# Patient Record
Sex: MALE | Race: WHITE | NOT HISPANIC OR LATINO | Employment: OTHER | URBAN - METROPOLITAN AREA
[De-identification: names, ages, dates, MRNs, and addresses within clinical notes are randomized per-mention and may not be internally consistent; named-entity substitution may affect disease eponyms.]

---

## 2017-08-03 ENCOUNTER — PATIENT OUTREACH (OUTPATIENT)
Dept: HEALTH INFORMATION MANAGEMENT | Facility: OTHER | Age: 72
End: 2017-08-03

## 2017-08-03 NOTE — PROGRESS NOTES
Attempt #:1    WebIZ Checked & Epic Updated: yes  HealthConnect Verified: no  Verify PCP: yes    Communication Preference Obtained: yes     Review Care Team: yes    Annual Wellness Visit Scheduling  1. Scheduling Status:Scheduled       Care Gap Scheduling (Attempt to Schedule EACH Overdue Care Gap!)     Health Maintenance Due   Topic Date Due   • IMM DTaP/Tdap/Td Vaccine (1 - Tdap) [facility]   • COLONOSCOPY  Has an apt for Sept.   • IMM ZOSTER VACCINE  [facility]   • IMM PNEUMOCOCCAL 65+ (ADULT) LOW/MEDIUM RISK SERIES (1 of 2 - PCV13) [facility]   • Annual Wellness Visit  Scheduled         Corpsolv Activation: sent activation code  Corps[employer] Geovanni: no  Virtual Visits: no  Opt In to Text Messages: no

## 2017-08-09 ENCOUNTER — TELEPHONE (OUTPATIENT)
Dept: MEDICAL GROUP | Facility: PHYSICIAN GROUP | Age: 72
End: 2017-08-09

## 2017-08-09 NOTE — TELEPHONE ENCOUNTER
ANNUAL WELLNESS VISIT PRE-VISIT PLANNING     1.  Reviewed note from last office visit with PCP: YES    2.  If any orders were placed at last visit, do we have Results/Consult Notes?        •  Labs - Labs were not ordered at last office visit.       •  Imaging - Imaging was not ordered at last office visit.       •  Referrals - No referrals were ordered at last office visit.    3.  Immunizations were updated in Epic using WebIZ?: Epic matches WebIZ       •  WebIZ Recommendations: PREVNAR (PCV13) , TDAP and ZOSTAVAX (Shingles)       •  Is patient due for Tdap? YES. Patient was not notified of copay.       •  Is patient due for Shingles? YES. Patient was not notified of copay.     4.  Patient is due for the following Health Maintenance Topics:   Health Maintenance Due   Topic Date Due   • IMM DTaP/Tdap/Td Vaccine (1 - Tdap) 08[birth year]01/1964   • COLONOSCOPY  08/01/1994   • IMM ZOSTER VACCINE  08/01/2004   • IMM PNEUMOCOCCAL 65+ (ADULT) LOW/MEDIUM RISK SERIES (1 of 2 - PCV13) 08/01/2009   • Annual Wellness Visit  08/27/2016           5.  Reviewed/Updated the following with patient:       •   Preferred Pharmacy? NO       •   Preferred Lab? NO       •   Medications? NO       •   Social History? NO       •   Family History? NO    6.  Care Team Updated:       •   DME Company (gait device, O2, CPAP, etc.): N\A       •   Other Specialists (eye doctor, derm, GYN, cardiology, endo, etc): N\A    7.  Patient has the following Care Path diagnoses on Problem List:  NONE    8.  Specialty Comments was updated with diagnosis information provided by [facility]: N\A    9.  Patient was advised: “This is a free wellness visit. The provider will screen for medical conditions to help you stay healthy. If you have other concerns to address you may be asked to discuss these at a separate visit or there may be an additional fee.”     6.  Patient was informed to arrive 15 min prior to their scheduled appointment and bring in their medication bottles.

## 2017-08-17 ENCOUNTER — OFFICE VISIT (OUTPATIENT)
Dept: MEDICAL GROUP | Facility: PHYSICIAN GROUP | Age: 72
End: 2017-08-17
Payer: MEDICARE

## 2017-08-17 VITALS
BODY MASS INDEX: 24.28 KG/M2 | SYSTOLIC BLOOD PRESSURE: 136 MMHG | HEART RATE: 69 BPM | HEIGHT: 68 IN | OXYGEN SATURATION: 97 % | TEMPERATURE: 98.6 F | DIASTOLIC BLOOD PRESSURE: 78 MMHG | WEIGHT: 160.2 LBS | RESPIRATION RATE: 16 BRPM

## 2017-08-17 DIAGNOSIS — Z86.010 HISTORY OF COLON POLYPS: ICD-10-CM

## 2017-08-17 DIAGNOSIS — N52.9 ERECTILE DYSFUNCTION, UNSPECIFIED ERECTILE DYSFUNCTION TYPE: ICD-10-CM

## 2017-08-17 DIAGNOSIS — L57.0 ACTINIC KERATOSIS: ICD-10-CM

## 2017-08-17 DIAGNOSIS — I10 ESSENTIAL HYPERTENSION: ICD-10-CM

## 2017-08-17 DIAGNOSIS — E83.110 HEREDITARY HEMOCHROMATOSIS (HCC): ICD-10-CM

## 2017-08-17 DIAGNOSIS — Z00.00 MEDICARE ANNUAL WELLNESS VISIT, SUBSEQUENT: ICD-10-CM

## 2017-08-17 DIAGNOSIS — E78.2 MIXED HYPERLIPIDEMIA: ICD-10-CM

## 2017-08-17 PROCEDURE — G0439 PPPS, SUBSEQ VISIT: HCPCS | Performed by: FAMILY MEDICINE

## 2017-08-17 RX ORDER — SILDENAFIL 100 MG/1
50-100 TABLET, FILM COATED ORAL PRN
Qty: 10 TAB | Refills: 3 | Status: SHIPPED | OUTPATIENT
Start: 2017-08-17

## 2017-08-17 ASSESSMENT — PAIN SCALES - GENERAL: PAINLEVEL: 2=MINIMAL-SLIGHT

## 2017-08-17 ASSESSMENT — PATIENT HEALTH QUESTIONNAIRE - PHQ9: CLINICAL INTERPRETATION OF PHQ2 SCORE: 0

## 2017-08-17 NOTE — PROGRESS NOTES
Chief Complaint   Patient presents with   • Annual Wellness Visit         HPI:  Steve is a 72 y.o. here for Medicare Annual Wellness Visit        Patient Active Problem List    Diagnosis Date Noted   • Left upper arm pain 04/19/2016   • Hypokalemia 04/19/2016   • Rosacea 04/19/2016   • Actinic keratosis 09/09/2014   • HTN (hypertension) 08/07/2014   • Hyperlipidemia 08/07/2014   • Hereditary hemochromatosis (CMS-HCC) 08/07/2014   • Dizziness 08/07/2014   • History of colon polyps 08/07/2014       Current Outpatient Prescriptions   Medication Sig Dispense Refill   • Cobalamine Combinations (LIVER-B-12 PO) Take  by mouth.     • POTASSIUM PO Take  by mouth.     • triamterene/hctz (MAXZIDE-25/DYAZIDE) 37.5-25 MG CAPS Take 1 Cap by mouth every morning. 90 Cap 3   • aspirin EC (ECOTRIN) 81 MG TBEC Take 81 mg by mouth every day.     • Cholecalciferol (VITAMIN D PO) Take  by mouth.     • FOLIC ACID PO Take  by mouth.     • simvastatin (ZOCOR) 10 MG TABS Take 10 mg by mouth every evening.       No current facility-administered medications for this visit.        Patient is taking medications as noted in medication list.  Current supplements as per medication list.     Allergies: Lisinopril    Current social contact/activities: out door activities    Is patient current with immunizations? Yes.    He  reports that he has been smoking Cigarettes.  He has a 15 pack-year smoking history. He has never used smokeless tobacco. He reports that he drinks alcohol. He reports that he does not use illicit drugs.  Ready to quit: Yes  Counseling given: Yes        DPA/Advanced directive: Patient has Advanced Directive, but it is not on file. Instructed to bring in a copy to scan into their chart.    ROS:    Gait: Uses no assistive device   Ostomy: no   Other tubes: no   Amputations: no   Chronic oxygen use no   Last eye exam 2015   Wears hearing aids: no   : Denies incontinence.         Depression Screening    Little interest or  pleasure in doing things?  0 - not at all  Feeling down, depressed, or hopeless? 0 - not at all  Patient Health Questionnaire Score: 0    If depressive symptoms identified deferred to follow up visit unless specifically addressed in assessment and plan.    Interpretation of PHQ-9 Total Score   Score Severity   1-4 No Depression   5-9 Mild Depression   10-14 Moderate Depression   15-19 Moderately Severe Depression   20-27 Severe Depression    Screening for Cognitive Impairment    Three Minute Recall (apple, watch, josé miguel)  2/3    Draw clock face with all 12 numbers set to the hand to show 10 minutes past 11 o'clock    5/5  If cognitive concerns identified, deferred for follow up unless specifically addressed in assessment and plan.    Fall Risk Assessment    Has the patient had two or more falls in the last year or any fall with injury in the last year?  Yes  If fall risk identified, deferred for follow up unless specifically addressed in assessment and plan.    Safety Assessment    Throw rugs on floor.  No  Handrails on all stairs.  Yes  Good lighting in all hallways.  Yes  Difficulty hearing.  No  Patient counseled about all safety risks that were identified.    Functional Assessment ADLs    Are there any barriers preventing you from cooking for yourself or meeting nutritional needs?  No.    Are there any barriers preventing you from driving safely or obtaining transportation?  No.    Are there any barriers preventing you from using a telephone or calling for help?  No.    Are there any barriers preventing you from shopping?  No.    Are there any barriers preventing you from taking care of your own finances?  No.    Are there any barriers preventing you from managing your medications?  No.    Are you currently engaging any exercise or physical activity?  Yes.  Stationary bikes 3 to 4 nights a week 20 minutes    Health Maintenance Summary                IMM DTaP/Tdap/Td Vaccine Overdue 8/1/1963     COLONOSCOPY  "Overdue 8/1/1994     IMM ZOSTER VACCINE Overdue 8/1/2004     IMM PNEUMOCOCCAL 65+ (ADULT) LOW/MEDIUM RISK SERIES Overdue 8/1/2009     Annual Wellness Visit Overdue 8/27/2016      Done 8/27/2015 Visit Dx: Encounter for Medicare annual wellness exam    IMM INFLUENZA Next Due 9/1/2017           Patient Care Team:  Yola Asif M.D. as PCP - General (Family Medicine)  Alan Wick M.D. as Consulting Physician (Family Medicine)    Social History   Substance Use Topics   • Smoking status: Current Every Day Smoker -- 0.30 packs/day for 50 years     Types: Cigarettes   • Smokeless tobacco: Never Used   • Alcohol Use: 0.0 oz/week     0 Standard drinks or equivalent per week      Comment: Nearly 1 drink nightly, liqour.     Family History   Problem Relation Age of Onset   • Stroke Father 78     He  has a past medical history of HTN (hypertension) (8/7/2014); Hyperlipidemia (8/7/2014); and Hypertension.   Past Surgical History   Procedure Laterality Date   • Ganglion excision             Exam:     Blood pressure 136/78, pulse 69, temperature 37 °C (98.6 °F), resp. rate 16, height 1.715 m (5' 7.5\"), weight 72.666 kg (160 lb 3.2 oz), SpO2 97 %. Body mass index is 24.71 kg/(m^2).    Hearing excellent.    Dentition good  Alert, oriented in no acute distress.  Eye contact is good, speech goal directed, affect calm      Assessment and Plan. The following treatment and monitoring plan is recommended:    Encounter Diagnoses   Name Primary?   • Actinic keratosis    • Hereditary hemochromatosis (CMS-HCC)    • History of colon polyps    • Essential hypertension    • Mixed hyperlipidemia    • Medicare annual wellness visit, subsequent    • Erectile dysfunction, unspecified erectile dysfunction type      Stable, request [facility] records, follow up in 3 months      Services suggested: No services needed at this time, we do need to work [facility].  Patient will work on getting patient portal system.    Health Care " Screening recommendations as per orders if indicated.  Referrals offered: PT/OT/Nutrition counseling/Behavioral Health/Smoking cessation as per orders if indicated.    Discussion today about general wellness and lifestyle habits:    · Prevent falls and reduce trip hazards; Cautioned about securing or removing rugs.  · Have a working fire alarm and carbon monoxide detector;   · Engage in regular physical activity and social activities       Follow-up: No Follow-up on file.

## 2017-08-17 NOTE — MR AVS SNAPSHOT
"        Steve Darryn   2017 8:40 AM   Office Visit   MRN: [id number]    Department:  [facility]   Dept Phone:  [phone]    Description:  Male : 1945   Provider:  Yola Asif M.D.; [facility]            Reason for Visit     Annual Wellness Visit           Allergies as of 2017     Allergen Noted Reactions    Lisinopril 2014   Swelling      You were diagnosed with     Actinic keratosis   [702.0.ICD-9-CM]       Hereditary hemochromatosis (CMS-HCC)   [275.01.ICD-9-CM]       History of colon polyps   [578937]       Essential hypertension   [5623773]       Mixed hyperlipidemia   [272.2.ICD-9-CM]       Medicare annual wellness visit, subsequent   [128570]       Erectile dysfunction, unspecified erectile dysfunction type   [8726574]         Vital Signs     Blood Pressure Pulse Temperature Respirations Height Weight    136/78 mmHg 69 37 °C (98.6 °F) 16 1.715 m (5' 7.5\") 72.666 kg (160 lb 3.2 oz)    Body Mass Index Oxygen Saturation Smoking Status             24.71 kg/m2 97% Current Every Day Smoker         Basic Information     Date Of Birth Sex Race Ethnicity Preferred Language    1945 Male White Non- English      Problem List              ICD-10-CM Priority Class Noted - Resolved    HTN (hypertension) I10   2014 - Present    Hyperlipidemia E78.5   2014 - Present    Hereditary hemochromatosis (CMS-HCC) E83.110   2014 - Present    History of colon polyps Z86.010   2014 - Present    Actinic keratosis L57.0   2014 - Present      Health Maintenance        Date Due Completion Dates    IMM DTaP/Tdap/Td Vaccine (1 - Tdap) 1963 ---    COLONOSCOPY 1994 ---    IMM ZOSTER VACCINE 2004 ---    IMM PNEUMOCOCCAL 65+ (ADULT) LOW/MEDIUM RISK SERIES (1 of 2 - PCV13) 2009 ---    IMM INFLUENZA (1) 2017 ---            Current Immunizations     No immunizations on file.      Below and/or attached are the medications your provider expects you " to take. Review all of your home medications and newly ordered medications with your provider and/or pharmacist. Follow medication instructions as directed by your provider and/or pharmacist. Please keep your medication list with you and share with your provider. Update the information when medications are discontinued, doses are changed, or new medications (including over-the-counter products) are added; and carry medication information at all times in the event of emergency situations     Allergies:  LISINOPRIL - Swelling               Medications  Valid as of: August 17, 2017 - 10:06 AM    Generic Name Brand Name Tablet Size Instructions for use    Aspirin (Tablet Delayed Response) ECOTRIN 81 MG Take 81 mg by mouth every day.        Cholecalciferol   Take  by mouth.        Cobalamine Combinations   Take  by mouth.        Folic Acid   Take  by mouth.        Potassium   Take  by mouth.        Sildenafil Citrate (Tab) VIAGRA 100 MG Take 0.5-1 Tabs by mouth as needed for Erectile Dysfunction.        Simvastatin (Tab) ZOCOR 10 MG Take 10 mg by mouth every evening.        Triamterene-HCTZ (Cap) MAXZIDE-25/DYAZIDE 37.5-25 MG Take 1 Cap by mouth every morning.        .                 Medicines prescribed today were sent to:     [id number] - [place], [place] - [address].    [address]. [facility] [address]    Phone: [phone] Fax: [phone]    Open 24 Hours?: No    [id number] - [place], [place] - [address] [address]    Phone: [phone] Fax: [phone]    Open 24 Hours?: No      Medication refill instructions:       If your prescription bottle indicates you have medication refills left, it is not necessary to call your provider’s office. Please contact your pharmacy and they will refill your medication.    If your prescription bottle indicates you do not have any refills left, you may request refills at any time through one of the following ways: The  online [employer] system (except Urgent Care), by calling your provider’s office, or by asking your pharmacy to contact your provider’s office with a refill request. Medication refills are processed only during regular business hours and may not be available until the next business day. Your provider may request additional information or to have a follow-up visit with you prior to refilling your medication.   *Please Note: Medication refills are assigned a new Rx number when refilled electronically. Your pharmacy may indicate that no refills were authorized even though a new prescription for the same medication is available at the pharmacy. Please request the medicine by name with the pharmacy before contacting your provider for a refill.           [employer] Access Code: [email or web address]  Expires: 9/2/2017  2:08 PM    [employer]  A secure, online tool to manage your health information     [employer]® is a secure, online tool that connects you to your personalized health information from the privacy of your home -- day or night - making it very easy for you to manage your healthcare. Once the activation process is completed, you can even access your medical information using the [employer] geovanni, which is available for free in the Apple Geovanni store or Google Play store.     [employer] provides the following levels of access (as shown below):   My Chart Features   [facility] Doctor [facility] [place] [facility]  Urgent  Care Non-[facility]  Primary Care  Doctor   Email your healthcare team securely and privately 24/7 X X X    Manage appointments: schedule your next appointment; view details of past/upcoming appointments X      Request prescription refills. X      View recent personal medical records, including lab and immunizations X X X X   View health record, including health history, allergies, medications X X X X   Read reports about your outpatient visits, procedures, consult and ER notes X X X X   See your  discharge summary, which is a recap of your hospital and/or ER visit that includes your diagnosis, lab results, and care plan. X X       How to register for [employer]:  1. Go to  [email or web address].  2. Click on the Sign Up Now box, which takes you to the New Member Sign Up page. You will need to provide the following information:  a. Enter your [employer] Access Code exactly as it appears at the top of this page. (You will not need to use this code after you’ve completed the sign-up process. If you do not sign up before the expiration date, you must request a new code.)   b. Enter your date of birth.   c. Enter your home email address.   d. Click Submit, and follow the next screen’s instructions.  3. Create a [employer] ID. This will be your [employer] login ID and cannot be changed, so think of one that is secure and easy to remember.  4. Create a [employer] password. You can change your password at any time.  5. Enter your Password Reset Question and Answer. This can be used at a later time if you forget your password.   6. Enter your e-mail address. This allows you to receive e-mail notifications when new information is available in [employer].  7. Click Sign Up. You can now view your health information.    For assistance activating your [employer] account, call [phone]        Quit Tobacco Information     Do you want to quit using tobacco?    Quitting tobacco decreases risks of cancer, heart and lung disease, increases life expectancy, improves sense of taste and smell, and increases spending money, among other benefits.    If you are thinking about quitting, we can help.  • [employer] Quit Tobacco Program: 653.363.1665  o Program occurs weekly for four weeks and includes pharmacist consultation on products to support quitting smoking or chewing tobacco. A provider referral is needed for pharmacist consultation.  • Tobacco Users Help Hotline: 4-800-QUIT-NOW (385-1318) or https://[place].quitlogix.org/  o Free,  confidential telephone and online coaching for [place] residents. Sessions are designed on a schedule that is convenient for you. Eligible clients receive free nicotine replacement therapy.  • Nationally: www.smokefree.gov  o Information and professional assistance to support both immediate and long-term needs as you become, and remain, a non-smoker. Smokefree.gov allows you to choose the help that best fits your needs.

## 2017-10-20 ENCOUNTER — APPOINTMENT (RX ONLY)
Dept: URBAN - METROPOLITAN AREA CLINIC 4 | Facility: CLINIC | Age: 72
Setting detail: DERMATOLOGY
End: 2017-10-20

## 2017-10-20 DIAGNOSIS — L72.0 EPIDERMAL CYST: ICD-10-CM

## 2017-10-20 DIAGNOSIS — L73.8 OTHER SPECIFIED FOLLICULAR DISORDERS: ICD-10-CM

## 2017-10-20 DIAGNOSIS — L70.8 OTHER ACNE: ICD-10-CM

## 2017-10-20 PROCEDURE — 99211 OFF/OP EST MAY X REQ PHY/QHP: CPT | Mod: 25

## 2017-10-20 PROCEDURE — 10040 EXTRACTION: CPT

## 2017-10-20 PROCEDURE — ? EXCISION OF MILIA

## 2017-10-20 PROCEDURE — ? COSMETIC EXTRACTIONS

## 2017-10-20 ASSESSMENT — LOCATION DETAILED DESCRIPTION DERM
LOCATION DETAILED: RIGHT SUPERIOR CENTRAL MALAR CHEEK
LOCATION DETAILED: RIGHT INFERIOR CENTRAL MALAR CHEEK
LOCATION DETAILED: RIGHT FOREHEAD
LOCATION DETAILED: LEFT MID PREAURICULAR CHEEK
LOCATION DETAILED: LEFT LATERAL MANDIBULAR CHEEK
LOCATION DETAILED: LEFT CENTRAL MALAR CHEEK
LOCATION DETAILED: RIGHT LATERAL EYEBROW
LOCATION DETAILED: RIGHT LATERAL MALAR CHEEK
LOCATION DETAILED: LEFT INFERIOR CENTRAL MALAR CHEEK
LOCATION DETAILED: RIGHT MEDIAL FRONTAL SCALP
LOCATION DETAILED: RIGHT INFERIOR TEMPLE
LOCATION DETAILED: RIGHT LATERAL FOREHEAD
LOCATION DETAILED: RIGHT INFERIOR PREAURICULAR CHEEK
LOCATION DETAILED: RIGHT LATERAL MANDIBULAR CHEEK
LOCATION DETAILED: RIGHT SUPERIOR LATERAL MALAR CHEEK
LOCATION DETAILED: LEFT INFERIOR LATERAL FOREHEAD
LOCATION DETAILED: LEFT SUPERIOR LATERAL BUCCAL CHEEK

## 2017-10-20 ASSESSMENT — LOCATION ZONE DERM
LOCATION ZONE: FACE
LOCATION ZONE: SCALP

## 2017-10-20 ASSESSMENT — LOCATION SIMPLE DESCRIPTION DERM
LOCATION SIMPLE: RIGHT TEMPLE
LOCATION SIMPLE: RIGHT MALAR CHEEK
LOCATION SIMPLE: RIGHT PREAURICULAR CHEEK
LOCATION SIMPLE: RIGHT MANDIBULAR CHEEK
LOCATION SIMPLE: RIGHT FRONTAL SCALP
LOCATION SIMPLE: LEFT PREAURICULAR CHEEK
LOCATION SIMPLE: RIGHT FOREHEAD
LOCATION SIMPLE: LEFT MANDIBULAR CHEEK
LOCATION SIMPLE: RIGHT EYEBROW
LOCATION SIMPLE: LEFT FOREHEAD
LOCATION SIMPLE: LEFT MALAR CHEEK
LOCATION SIMPLE: LEFT BUCCAL CHEEK

## 2017-10-20 NOTE — PROCEDURE: EXCISION OF MILIA
Consent: The risks, benefits, complications, and alternatives of milia marsupialization or excision were discussed in detail.  Risks and complications include but are not limited to: pain, infection, bleeding, swelling, recurrence, and scarring, among others.
Complications?: No
Detail Level: Detailed
Other Instruments: #11 blade
Excision Type: Opening or removal of milia

## 2017-10-20 NOTE — PROCEDURE: COSMETIC EXTRACTIONS
Consent: The patient's consent was obtained including but not limited to risks of crusting, scabbing, blistering, scarring, darker or lighter pigmentary change, recurrence, incomplete removal and infection.
Post-Care Instructions: I reviewed with the patient in detail post-care instructions. Patient is to wear sunprotection, and avoid picking at any of the treated lesions. Pt may apply Vaseline to crusted or scabbing areas.
Render The Number Of Extractions: Yes
Anesthesia Volume In Cc: 0
Detail Level: Detailed
Render The Number Of Extractions: No

## 2018-03-09 ENCOUNTER — APPOINTMENT (RX ONLY)
Dept: URBAN - METROPOLITAN AREA CLINIC 4 | Facility: CLINIC | Age: 73
Setting detail: DERMATOLOGY
End: 2018-03-09

## 2018-03-09 DIAGNOSIS — L70.8 OTHER ACNE: ICD-10-CM

## 2018-03-09 DIAGNOSIS — L72.0 EPIDERMAL CYST: ICD-10-CM

## 2018-03-09 PROCEDURE — ? COSMETIC EXTRACTIONS

## 2018-03-09 ASSESSMENT — LOCATION DETAILED DESCRIPTION DERM
LOCATION DETAILED: RIGHT SUPERIOR CENTRAL MALAR CHEEK
LOCATION DETAILED: LEFT SUPERIOR LATERAL NECK
LOCATION DETAILED: LEFT LOWER CUTANEOUS LIP
LOCATION DETAILED: LEFT SUPERIOR CENTRAL MALAR CHEEK
LOCATION DETAILED: LEFT LATERAL MALAR CHEEK
LOCATION DETAILED: RIGHT MEDIAL MALAR CHEEK
LOCATION DETAILED: LEFT CHIN
LOCATION DETAILED: NASAL TIP
LOCATION DETAILED: RIGHT LOWER CUTANEOUS LIP
LOCATION DETAILED: RIGHT CHIN
LOCATION DETAILED: LEFT SUPERIOR MEDIAL BUCCAL CHEEK
LOCATION DETAILED: RIGHT NASAL ALAR GROOVE
LOCATION DETAILED: NASAL DORSUM
LOCATION DETAILED: LEFT INFERIOR LATERAL MALAR CHEEK
LOCATION DETAILED: LEFT NASAL SIDEWALL

## 2018-03-09 ASSESSMENT — LOCATION SIMPLE DESCRIPTION DERM
LOCATION SIMPLE: LEFT CHEEK
LOCATION SIMPLE: CHIN
LOCATION SIMPLE: RIGHT NOSE
LOCATION SIMPLE: LEFT NOSE
LOCATION SIMPLE: LEFT LIP
LOCATION SIMPLE: NOSE
LOCATION SIMPLE: LEFT ANTERIOR NECK
LOCATION SIMPLE: RIGHT LIP
LOCATION SIMPLE: RIGHT CHEEK

## 2018-03-09 ASSESSMENT — LOCATION ZONE DERM
LOCATION ZONE: NOSE
LOCATION ZONE: LIP
LOCATION ZONE: NECK
LOCATION ZONE: FACE

## 2018-03-09 NOTE — PROCEDURE: COSMETIC EXTRACTIONS
Consent: The patient's consent was obtained including but not limited to risks of crusting, scabbing, blistering, scarring, darker or lighter pigmentary change, recurrence, incomplete removal and infection.
Anesthesia Volume In Cc: 0.1
Post-Care Instructions: I reviewed with the patient in detail post-care instructions. Patient is to wear sunprotection, and avoid picking at any of the treated lesions.
Render The Number Of Extractions: No
Price (Use Numbers Only, No Special Characters Or $): 80
Detail Level: Detailed

## 2021-01-14 DIAGNOSIS — Z23 NEED FOR VACCINATION: ICD-10-CM

## 2023-07-14 ENCOUNTER — HOSPITAL ENCOUNTER (OUTPATIENT)
Dept: RADIOLOGY | Facility: MEDICAL CENTER | Age: 78
End: 2023-07-14
Attending: SURGERY
Payer: COMMERCIAL

## 2023-07-17 ENCOUNTER — HOSPITAL ENCOUNTER (OUTPATIENT)
Dept: RADIATION ONCOLOGY | Facility: MEDICAL CENTER | Age: 78
End: 2023-07-31
Attending: RADIOLOGY
Payer: COMMERCIAL

## 2023-07-17 VITALS
WEIGHT: 155.42 LBS | BODY MASS INDEX: 23.98 KG/M2 | HEART RATE: 65 BPM | OXYGEN SATURATION: 92 % | DIASTOLIC BLOOD PRESSURE: 76 MMHG | SYSTOLIC BLOOD PRESSURE: 148 MMHG

## 2023-07-17 DIAGNOSIS — C34.11 PRIMARY ADENOCARCINOMA OF UPPER LOBE OF RIGHT LUNG (HCC): ICD-10-CM

## 2023-07-17 PROCEDURE — 99205 OFFICE O/P NEW HI 60 MIN: CPT | Performed by: RADIOLOGY

## 2023-07-17 RX ORDER — CARVEDILOL 3.12 MG/1
3.12 TABLET ORAL 2 TIMES DAILY WITH MEALS
COMMUNITY

## 2023-07-17 RX ORDER — MAGNESIUM OXIDE 400 MG/1
400 TABLET ORAL DAILY
COMMUNITY

## 2023-07-17 RX ORDER — ALLOPURINOL 100 MG/1
100 TABLET ORAL DAILY
COMMUNITY

## 2023-07-17 ASSESSMENT — PAIN SCALES - GENERAL: PAINLEVEL: NO PAIN

## 2023-07-17 NOTE — CT SIMULATION
PATIENT NAME Steve Cates   PRIMARY PHYSICIAN Pcp Not In Computer [id number]   REFERRING PHYSICIAN Gaby Rao M.D. 1945     No matching staging information was found for the patient.       Treatment Planning CT Simulation      Order Questions     Question Answer    Is this for a new course of treatment? Yes    Is this an Addendum? No    Implanted Device/Pacemaker No    Simulation Status Initial    Planned Start Date 7/31/2023    Treatment Site Lung    Laterality Right    Treatment Technique SBRT    Treatment Pattern/Frequency Daily    Number of fractions: 5    Concurrent Chemotherapy No    CT Technique 4D    Slice Thickness 2mm    Scan Extent Chest    Treatment Device(s) OmniBoard    Patient Attire Gown    Patient Position Supine    Patient Orientation Head First    Arm Position Up    Treatment Machine TB1 - STx    Treatment Image Guidance CBCT    Frequency (CBCT) Daily    Image Guidance Match PTV - Soft Tissue    Fiducial Tracking 4D CBCT    Treatment Planning Image Fusion CT/PET    Special Physics Consult Stereotactic    Other Orders Weekly Physics Check     Special Tx Procedure    Release to patient Immediate

## 2023-07-17 NOTE — PROGRESS NOTES
Patient was seen today in clinic with Dr. Davis for consultation.  Vitals signs and weight were obtained and pain assessment was completed.  Allergies and medications were reviewed with the patient.      Vitals/Pain:  Vitals:    07/17/23 1350   BP: (!) 148/76   BP Location: Right arm   Patient Position: Sitting   BP Cuff Size: Adult   Pulse: 65   SpO2: 92%   Weight: 70.5 kg (155 lb 6.8 oz)   Pain Score: No pain        Allergies:   Lisinopril    Current Medications:  Current Outpatient Medications   Medication Sig Dispense Refill    carvedilol (COREG) 3.125 MG Tab Take 3.125 mg by mouth 2 times a day with meals.      allopurinol (ZYLOPRIM) 100 MG Tab Take 100 mg by mouth every day.      magnesium oxide (MAG-OX) 400 MG Tab tablet Take 400 mg by mouth every day.      Cobalamine Combinations (LIVER-B-12 PO) Take  by mouth.      sildenafil citrate (VIAGRA) 100 MG tablet Take 0.5-1 Tabs by mouth as needed for Erectile Dysfunction. 10 Tab 3    POTASSIUM PO Take  by mouth.      triamterene/hctz (MAXZIDE-25/DYAZIDE) 37.5-25 MG CAPS Take 1 Cap by mouth every morning. 90 Cap 3    Cholecalciferol (VITAMIN D PO) Take  by mouth.      FOLIC ACID PO Take  by mouth.      simvastatin (ZOCOR) 10 MG TABS Take 10 mg by mouth every evening.      aspirin EC (ECOTRIN) 81 MG TBEC Take 81 mg by mouth every day. (Patient not taking: Reported on 7/17/2023)       No current facility-administered medications for this encounter.         PCP:  Pcp Not In Computer        Neli Bae R.N.

## 2023-07-17 NOTE — CONSULTS
RADIATION ONCOLOGY CONSULT  Patient name:  Steve Cates    Primary Physician:  Pcp Not In Computer MRN: [id number]  [facility]: [id number]   Referring physician:  Gaby Rao M.D.  : 1945, 77 y.o.       DATE OF SERVICE: 2023    IDENTIFICATION: A 77 y.o. male with  Visit Diagnoses     ICD-10-CM   1. Primary adenocarcinoma of upper lobe of right lung (HCC)  C34.11      Primary adenocarcinoma of upper lobe of right lung (HCC)  Staging form: Lung, AJCC 8th Edition  - Clinical stage from 2023: Stage IA2 (cT1b, cN0, cM0) - Signed by Efrem Davis M.D. on 2023  Histopathologic type: Adenocarcinoma, NOS  Stage prefix: Initial diagnosis      He is here at the kind request of Gaby Salazar M.D.     HISTORY OF PRESENT ILLNESS:   Subjective     This is a 77-year-old gentleman who comes to discuss treatment options for his early-stage lung cancer.  In brief, his history dates back to 2022 when he was noted to have an enlarging nodule in the right upper lobe has been followed since 2021.  As a result and given his prior smoking history, this was worked up with a PET scan that found it to be slightly larger than prior measuring 1.5 cm with a relatively low uptake of SUV 1.3.  No other disease was present.    [facility] at which point I had felt that given that it was relatively small and not PET avid, which ideally proceed with biopsy if this was suspicious.  This was ultimately completed on  that confirmed non-small cell lung cancer, adenocarcinoma.  He met with Dr. Rao, and reviewed treatment options including surgery versus SBRT.  I do not have recent PFTs, but he did [facility], DLCO was 49%.  He was felt to be relatively reasonable candidate for surgery or radiation, and wanted to discuss radiation further for which he presents today.  He does not have any major cardiopulmonary complaints, and does not have  any pain or local symptoms of the location of the nodule.  He does say that he does not exert himself significantly so as to not have significant dyspnea or limitations, and his most significant physical activity is mowing the lawn which is tolerable for him.  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        PAST MEDICAL HISTORY:   Past Medical History:   Diagnosis Date    Basal cell carcinoma (BCC) of skin of face     Nose    Benign neoplasm of colon     Cancer (HCC)     CKD (chronic kidney disease), stage III (HCC)     Gout     HTN (hypertension) 08/07/2014    Hyperlipidemia 08/07/2014    Hypertension     Kidney disease     Primary adenocarcinoma of upper lobe of right lung (HCC)        PAST SURGICAL HISTORY:  Past Surgical History:   Procedure Laterality Date    GANGLION EXCISION         CURRENT MEDICATIONS:  Current Outpatient Medications   Medication Sig Dispense Refill    carvedilol (COREG) 3.125 MG Tab Take 3.125 mg by mouth 2 times a day with meals.      allopurinol (ZYLOPRIM) 100 MG Tab Take 100 mg by mouth every day.      magnesium oxide (MAG-OX) 400 MG Tab tablet Take 400 mg by mouth every day.      Cobalamine Combinations (LIVER-B-12 PO) Take  by mouth.      sildenafil citrate (VIAGRA) 100 MG tablet Take 0.5-1 Tabs by mouth as needed for Erectile Dysfunction. 10 Tab 3    POTASSIUM PO Take  by mouth.      triamterene/hctz (MAXZIDE-25/DYAZIDE) 37.5-25 MG CAPS Take 1 Cap by mouth every morning. 90 Cap 3    Cholecalciferol (VITAMIN D PO) Take  by mouth.      FOLIC ACID PO Take  by mouth.      simvastatin (ZOCOR) 10 MG TABS Take 10 mg by mouth every evening.      aspirin EC (ECOTRIN) 81 MG TBEC Take 81 mg by mouth every day. (Patient not taking: Reported on 7/17/2023)       No current facility-administered medications for this encounter.       ALLERGIES:    Lisinopril    FAMILY HISTORY:    family history includes Stroke (age of  "onset: 78) in his father.    SOCIAL HISTORY:     reports that he has been smoking cigarettes. He has a 15.00 pack-year smoking history. He has never used smokeless tobacco. He reports current alcohol use of about 0.6 - 1.2 oz of alcohol per week. He reports that he does not use drugs. Patient is a 77 year old male who resides in [place] with his Spouse. He is retired.     REVIEW OF SYSTEMS: Complete review of systems taken.  Pertinent items in HPI.  All others negative.    PAIN ASSESSMENT:      7/17/2023     1:50 PM   Pain Assessment   Pain Score NO PAIN         PHYSICAL EXAM:   PERFORMANCE STATUS:      7/17/2023     1:57 PM   Karnofsky Score   Karnofsky Score 70         7/17/2023     1:57 PM   ECOG Performance Review   ECOG Performance Status Restricted in physically strenuous activity but ambulatory and able to carry out work of a light or sedentary nature, e.g., light house work, office work     BP (!) 148/76 (BP Location: Right arm, Patient Position: Sitting, BP Cuff Size: Adult) Comment: \"Did not take BP meds.\"  Pulse 65   Wt 70.5 kg (155 lb 6.8 oz)   SpO2 92%   BMI 23.98 kg/m²   Physical Exam  Constitutional:       Appearance: Normal appearance.   HENT:      Head: Normocephalic and atraumatic.   Eyes:      Extraocular Movements: Extraocular movements intact.      Conjunctiva/sclera: Conjunctivae normal.   Cardiovascular:      Rate and Rhythm: Normal rate and regular rhythm.   Pulmonary:      Effort: Pulmonary effort is normal.      Breath sounds: Normal breath sounds.   Abdominal:      General: Abdomen is flat.      Palpations: Abdomen is soft.   Musculoskeletal:         General: Normal range of motion.   Neurological:      General: No focal deficit present.      Mental Status: He is alert and oriented to person, place, and time.          LABORATORY DATA:  No results found for: WBC, RBC, HEMOGLOBIN, HEMATOCRIT, MCV, MCH, MCHC, RDW, PLATELETCT, MPV, NEUTSPOLYS, LYMPHOCYTES, MONOCYTES, EOSINOPHILS, BASOPHILS, " HYPOCHROMIA, ANISOCYTOSIS   No results found for: SODIUM, POTASSIUM, CHLORIDE, CO2, GLUCOSE, BUN, CREATININE, BUN, GLOMRATE      RADIOLOGY DATA:  No results found.    IMPRESSION:    A 77 y.o. with  Visit Diagnoses     ICD-10-CM   1. Primary adenocarcinoma of upper lobe of right lung (HCC)  C34.11     Primary adenocarcinoma of upper lobe of right lung (HCC)  Staging form: Lung, AJCC 8th Edition  - Clinical stage from 7/17/2023: Stage IA2 (cT1b, cN0, cM0) - Signed by Efrem Davis M.D. on 7/17/2023  Histopathologic type: Adenocarcinoma, NOS  Stage prefix: Initial diagnosis        RECOMMENDATIONS:   I had a long discussion with Mr. Cates today regarding his diagnosis of early stage non-small cell lung cancer of the right upper lobe, the natural history and risk factors for lung cancer, and treatment options for relatively early stage disease.  Certainly his work-up is relatively complete, and he prefers to proceed with nonsurgical treatment.  Therefore, I discussed definitive curative options with SBRT, the high likelihood of local regional control, the overall favorable prognosis for ultimate long-term care ability and survival, reasonably equivalent results between surgery and stereotactic radiation, and the expected acute and long-term toxicities in detail, including fibrosis, pneumonitis, and chest wall tenderness.  He is in agreement with the plan as outlined, and wishes to proceed with definitive SBRT, and CT simulation will be scheduled for later this week, with 5 fraction SBRT to follow thereafter.    Thank you for the opportunity to participate in his care.  If any questions or comments, please do not hesitate in calling.  Approximately 65 minutes were spent on this visit, including face-to-face visit, imaging and records review, and postvisit documentation.    Orders Placed This Encounter    Rad Onc Treatment Planning CT Simulation    REFERRAL TO ONCOLOGY NURSE NAVIGATOR    carvedilol (COREG) 3.125 MG Tab     allopurinol (ZYLOPRIM) 100 MG Tab    magnesium oxide (MAG-OX) 400 MG Tab tablet

## 2023-07-19 ENCOUNTER — PATIENT OUTREACH (OUTPATIENT)
Dept: ONCOLOGY | Facility: MEDICAL CENTER | Age: 78
End: 2023-07-19
Payer: COMMERCIAL

## 2023-07-19 NOTE — LETTER
[facility]    [address]  [place] [address]  Phone: [phone] - Fax: [phone]              Steve Cates  [address] [address]     Date: 07/19/23    Dear Steve,    I am a Cancer Nurse Navigator, a certified oncology nurse. My role is to assess any needs you may have with education, guidance and support. I am available to you and your family through your treatment at [facility].       I am available to address your needs during your journey with the following services:     Care Coordination  I can assist you in facilitating communication between your cancer care treatment team to ensure timely treatment and follow-up.  I can also assist with transition of care back to your primary care provider, or other specialist, as needed.  My goal is to bridge gaps for you throughout the course of your active treatment.       Education Services  Understanding the recommended treatment course by your physician is key. I can provide educational resources personalized to your cancer diagnosis to help you understand your diagnosis and treatment. Please let me know if you would like to receive information about your diagnosis and treatment plan.  I am here to help.     Support Services/Resource Information  [facility] we offer a full scope of support services.  I can assist you with referral information to:  Cancer Clinical Trials & Research  Nutrition counseling  Support groups  Complementary Therapies such as Mind-Body Techniques Meditation  Patient Financial Advocates    [facility], an [employer] affiliate office, our volunteers can assist you with accessing our lending library, support services information, head coverings and comfort items  Community and national resources, included eligibility based rafaela assistance and pharmaceutical access programs, if you are in need of additional information.     [facility]  [facility] offers services that include:  Behavioral Health  Genetic counseling & testing  Acupuncture  Lymphedema prevention/treatment program  Palliative care services.        I hope you have an excellent patient experience.  Please feel free to share with me your comments regarding the care you have received- we value your feedback.    Sincerely,       Ronda Mcgee R.N.  Cancer Nurse Navigator    Office: [phone]  Main:  [phone]   Email:  [email or web address]

## 2023-07-19 NOTE — PROGRESS NOTES
Cancer Nurse Navigation Assessment:      Assessment/Discussion: Call placed to patient, introduced self and explained role of navigator.  Pt denies barriers to care and understands information.  Pt aware of appointment tomorrow.  No needs    TRANSPORTATION: pt will drive self  FINANCIAL:  [facility] covers all cost per [facility], denies financial concerns  SUPPORT SYSTEM:  Has support  PSYCHOLOGICAL:    Denies issues   DST: 0 administered in radiation oncology    Resources Provided/Intervention: Introduction letter, cancer support service calendar and contact information mailed to patient per request.

## 2023-07-20 ENCOUNTER — HOSPITAL ENCOUNTER (OUTPATIENT)
Dept: RADIATION ONCOLOGY | Facility: MEDICAL CENTER | Age: 78
End: 2023-07-20

## 2023-07-20 PROCEDURE — 77470 SPECIAL RADIATION TREATMENT: CPT | Mod: 26 | Performed by: RADIOLOGY

## 2023-07-20 PROCEDURE — 77470 SPECIAL RADIATION TREATMENT: CPT | Performed by: RADIOLOGY

## 2023-07-20 PROCEDURE — 77263 THER RADIOLOGY TX PLNG CPLX: CPT | Performed by: RADIOLOGY

## 2023-07-20 PROCEDURE — 77334 RADIATION TREATMENT AID(S): CPT | Performed by: RADIOLOGY

## 2023-07-20 PROCEDURE — 77290 THER RAD SIMULAJ FIELD CPLX: CPT | Performed by: RADIOLOGY

## 2023-07-20 PROCEDURE — 77334 RADIATION TREATMENT AID(S): CPT | Mod: 26 | Performed by: RADIOLOGY

## 2023-07-20 PROCEDURE — 77290 THER RAD SIMULAJ FIELD CPLX: CPT | Mod: 26 | Performed by: RADIOLOGY

## 2023-07-20 NOTE — RADIATION PLANNING NOTES
PATIENT NAME Steve Cates   PRIMARY PHYSICIAN Pcp Not In Computer [phone]   REFERRING PHYSICIAN No ref. provider found 1945     DATE OF SERVICE: 7/20/2023    DIAGNOSIS:  Primary adenocarcinoma of upper lobe of right lung (HCC)  Staging form: Lung, AJCC 8th Edition  - Clinical stage from 7/17/2023: Stage IA2 (cT1b, cN0, cM0) - Signed by Efrem Davis M.D. on 7/17/2023  Histopathologic type: Adenocarcinoma, NOS  Stage prefix: Initial diagnosis         SPECIAL TREATMENT PROCEDURE NOTE:  Considerable additional effort required in the management of this case because of administration of Stereotactic Radiotherapy, which may result in increased normal tissue toxicity and require greater effort in contouring and treatment because of greater precision.

## 2023-07-20 NOTE — RADIATION PLANNING NOTES
Clinical Treatment Planning Note    DATE OF SERVICE: 7/20/2023    DIAGNOSIS:  Primary adenocarcinoma of upper lobe of right lung (HCC)  Staging form: Lung, AJCC 8th Edition  - Clinical stage from 7/17/2023: Stage IA2 (cT1b, cN0, cM0) - Signed by Efrem Davis M.D. on 7/17/2023  Histopathologic type: Adenocarcinoma, NOS  Stage prefix: Initial diagnosis         IMAGING REVIEWED:  [x] CT     [] MRI     [x] PET/CT     [] BONE SCAN     [] MAMMO     [] OTHER      TREATMENT INTENT:   [x] CURATIVE     [] MAINTENANCE     []  PALLIATIVE      []  SUPPORTIVE     []  PROPHYLACTIC     [] BENIGN     []  CONSOLIDATIVE      [] DEFINITIVE   []  OLOGIMETASTATIC      LINE OF TREATMENT:  [] ADJUVANT   [x] DEFINITIVE   [] NEOADJUVANT   [] RE-TREATMENT      TECHNIQUE PLANNED:  [] IMRT   [] 3D   [x] SBRT   [] SRS/SRT   [] HDR   [] ELECTRON       IMRT JUSTIFICATION:  []   An immediately adjacent area has been previously irradiated and abutting portals must be established with high precision.    []  Dose escalation is planned to deliver radiation doses in excess of those commonly utilized for similar tumors with conventional treatment.    []  The target volume is concave or convex, and the critical normal tissues are within or around that convexity or concavity.    []  The target volume is in close proximity to critical structures that must be protected.    []  The volume of interest must be covered with narrow margins to adequately protect  immediately adjacent structures.      FIELDS & BLOCKING:  [x] COMPLEX BLOCKS     []  = 3 TX AREAS     []  ARCS     []  CUSTOM SHEILD        []  SIMPLE BLOCK      CHEMOTHERAPY:  []  CONCURRENT     []  INDUCTION     [] SEQUENTIAL     []  <30 DAYS FROM XRT      NOTES:  OAR CONSTRAINTS: (GUIDELINES ONLY NOT ABSOLUTE)  Target Prescribed Coverage   PTV 95% of PTV covered by 100% (Gy) of RX Dose     PTV 99% of PTV covered by 90% (Gy) of RX Dose       ANGE Goal   Total Lung Vol. (cc) critical structure   Total  Lung (cc) V12.5Gy < 1500cc   Total Lung (cc) V13.5Gy < 1000cc   Liver V21Gy < 700cc   Cord V22Gy < 0.35cc   Cord V14.5Gy <1.2cc   Cord Max Dose < 28Gy   Ipsilateral Brachial Plexus V27Gy < 3cc   Ipsilateral Brachial Plexus Max Dose < 32.5Gy   Esophagus V19.5Gy < 5cc   Esophagus Max Dose < 35Gy   Heart V32Gy < 15cc   Heart Max Dose < 38Gy   Great Vessels V47Gy < 10cc   Great Vessels Max Dose < 53Gy   Trachea/Large Bronchus V32Gy < 5cc   Trachea/Large Bronchus Max Dose < 40Gy   Small Bronchus V21Gy < 0.5cc   Small Bronchus Max Dose < 33Gy   Skin V36.5Gy < 10cc   Skin Max Dose < 38.5Gy   Ribs V45Gy < 5cc   Ribs Max Dose < 57Gy   *RTOG [id number]

## 2023-07-20 NOTE — RADIATION PLANNING NOTES
DATE OF SERVICE: 7/20/2023    DIAGNOSIS:  Primary adenocarcinoma of upper lobe of right lung (HCC)  Staging form: Lung, AJCC 8th Edition  - Clinical stage from 7/17/2023: Stage IA2 (cT1b, cN0, cM0) - Signed by Efrem Davis M.D. on 7/17/2023  Histopathologic type: Adenocarcinoma, NOS  Stage prefix: Initial diagnosis       DATE OF SERVICE: 7/20/2023    TYPE OF SIMULATION: SBRT    GOAL OF TREATMENT:   [x] Curative  [] Palliative  [] Oligometastatic    CONTRAST:    [] IV Contrast*  [] Oral Contrast               POSITION:    [x]  Supine  [] Prone     IMMOBILIZATION DEVICE: [x]  Body-Fix  [] Omniboard  []  Bellyboard    PROCEDURE: Patient placed in immobilization device. 4D gated and non-gated CT obtained to assess organ motion.  Images viewed and approved.  Images reconstructed as need.  Image data sets transferred to [employer] for planning.    I have personally reviewed the relevant data, performed the target localization, and determined all relevant factors for this patient’s simulation.    *Omnipaque 80 -100cc IVP in conjunction with 500cc NS

## 2023-07-27 PROCEDURE — 77334 RADIATION TREATMENT AID(S): CPT | Performed by: RADIOLOGY

## 2023-07-27 PROCEDURE — 77293 RESPIRATOR MOTION MGMT SIMUL: CPT | Performed by: RADIOLOGY

## 2023-07-27 PROCEDURE — 77300 RADIATION THERAPY DOSE PLAN: CPT | Mod: 26 | Performed by: RADIOLOGY

## 2023-07-27 PROCEDURE — 77295 3-D RADIOTHERAPY PLAN: CPT | Mod: 26 | Performed by: RADIOLOGY

## 2023-07-27 PROCEDURE — 77293 RESPIRATOR MOTION MGMT SIMUL: CPT | Mod: 26 | Performed by: RADIOLOGY

## 2023-07-27 PROCEDURE — 77334 RADIATION TREATMENT AID(S): CPT | Mod: 26 | Performed by: RADIOLOGY

## 2023-07-27 PROCEDURE — 77300 RADIATION THERAPY DOSE PLAN: CPT | Performed by: RADIOLOGY

## 2023-07-27 PROCEDURE — 77295 3-D RADIOTHERAPY PLAN: CPT | Performed by: RADIOLOGY

## 2023-07-28 PROCEDURE — 77370 RADIATION PHYSICS CONSULT: CPT | Performed by: RADIOLOGY

## 2023-07-31 ENCOUNTER — HOSPITAL ENCOUNTER (OUTPATIENT)
Dept: RADIATION ONCOLOGY | Facility: MEDICAL CENTER | Age: 78
End: 2023-07-31

## 2023-07-31 LAB
CHEMOTHERAPY INFUSION START DATE: NORMAL
CHEMOTHERAPY RECORDS: 11
CHEMOTHERAPY RECORDS: 5500
CHEMOTHERAPY RECORDS: NORMAL
CHEMOTHERAPY RX CANCER: NORMAL
DATE 1ST CHEMO CANCER: NORMAL
RAD ONC ARIA COURSE LAST TREATMENT DATE: NORMAL
RAD ONC ARIA COURSE TREATMENT ELAPSED DAYS: NORMAL
RAD ONC ARIA REFERENCE POINT DOSAGE GIVEN TO DATE: 11
RAD ONC ARIA REFERENCE POINT ID: NORMAL
RAD ONC ARIA REFERENCE POINT SESSION DOSAGE GIVEN: 11

## 2023-07-31 PROCEDURE — 77435 SBRT MANAGEMENT: CPT | Performed by: RADIOLOGY

## 2023-07-31 PROCEDURE — 77280 THER RAD SIMULAJ FIELD SMPL: CPT | Performed by: RADIOLOGY

## 2023-07-31 PROCEDURE — 77280 THER RAD SIMULAJ FIELD SMPL: CPT | Mod: 26 | Performed by: RADIOLOGY

## 2023-07-31 PROCEDURE — 77373 STRTCTC BDY RAD THER TX DLVR: CPT | Performed by: RADIOLOGY

## 2023-07-31 NOTE — PROCEDURES
DATE OF SERVICE: 7/31/2023    DIAGNOSIS:  Primary adenocarcinoma of upper lobe of right lung (HCC)  Staging form: Lung, AJCC 8th Edition  - Clinical stage from 7/17/2023: Stage IA2 (cT1b, cN0, cM0) - Signed by Efrem Davis M.D. on 7/17/2023  Histopathologic type: Adenocarcinoma, NOS  Stage prefix: Initial diagnosis       TREATMENT:  Radiation Therapy Episodes       Active Episodes       Radiation Therapy: SBRT (7/31/2023)                   Radiation Treatments         Plan Last Treated On Elapsed Days Fractions Treated Prescribed Fraction Dose (cGy) Prescribed Total Dose (cGy)    SBRT RUL Lung 7/31/2023 0 @ [id number] 1 of 5 1,100 5,500                  Reference Point Last Treated On Elapsed Days Most Recent Session Dose (cGy) Total Dose (cGy)    SBRT RUL Lung 7/31/2023 0 @ [id number] 1,100 1,100                            STEREOTACTIC PROCEDURE NOTE:    Called by Truebeam machine to verify treatment parameters including:  treatment site, treatment dose, and treatment setup prior to stereotactic treatment..    Patient was placed in the treatment position with use of immobilization device and  laser guidance. CBCT images were acquired for target localization.  Images were reviewed in the axial, coronal, and saggital views and shifts were made as necessary to ensure that patient position matched simulation position.      Treatment delivered per  prescription.  The medical physicist was present throughout the set-up, verification and treatment delivery to oversee the procedure and ensure all parameters agreed with the computerized plan.    I have personally reviewed the relevant data, performed the target localization, and determined all relevant factors for this patient’s simulation.

## 2023-08-01 ENCOUNTER — HOSPITAL ENCOUNTER (OUTPATIENT)
Dept: RADIATION ONCOLOGY | Facility: MEDICAL CENTER | Age: 78
End: 2023-08-01

## 2023-08-01 ENCOUNTER — HOSPITAL ENCOUNTER (OUTPATIENT)
Dept: RADIATION ONCOLOGY | Facility: MEDICAL CENTER | Age: 78
End: 2023-08-31
Attending: RADIOLOGY
Payer: COMMERCIAL

## 2023-08-01 LAB
CHEMOTHERAPY INFUSION START DATE: NORMAL
CHEMOTHERAPY RECORDS: 11
CHEMOTHERAPY RECORDS: 5500
CHEMOTHERAPY RECORDS: NORMAL
CHEMOTHERAPY RX CANCER: NORMAL
DATE 1ST CHEMO CANCER: NORMAL
RAD ONC ARIA COURSE LAST TREATMENT DATE: NORMAL
RAD ONC ARIA COURSE TREATMENT ELAPSED DAYS: NORMAL
RAD ONC ARIA REFERENCE POINT DOSAGE GIVEN TO DATE: 22
RAD ONC ARIA REFERENCE POINT ID: NORMAL
RAD ONC ARIA REFERENCE POINT SESSION DOSAGE GIVEN: 11

## 2023-08-01 PROCEDURE — 77280 THER RAD SIMULAJ FIELD SMPL: CPT | Mod: 26 | Performed by: RADIOLOGY

## 2023-08-01 PROCEDURE — 77280 THER RAD SIMULAJ FIELD SMPL: CPT | Performed by: RADIOLOGY

## 2023-08-01 PROCEDURE — 77373 STRTCTC BDY RAD THER TX DLVR: CPT | Performed by: RADIOLOGY

## 2023-08-01 NOTE — PROCEDURES
DATE OF SERVICE: 8/1/2023    DIAGNOSIS:  Primary adenocarcinoma of upper lobe of right lung (HCC)  Staging form: Lung, AJCC 8th Edition  - Clinical stage from 7/17/2023: Stage IA2 (cT1b, cN0, cM0) - Signed by Efrem Davis M.D. on 7/17/2023  Histopathologic type: Adenocarcinoma, NOS  Stage prefix: Initial diagnosis       TREATMENT:  Radiation Therapy Episodes       Active Episodes       Radiation Therapy: SBRT (7/31/2023)                   Radiation Treatments         Plan Last Treated On Elapsed Days Fractions Treated Prescribed Fraction Dose (cGy) Prescribed Total Dose (cGy)    SBRT RUL Lung 8/1/2023 1 @ [id number] 2 of 5 1,100 5,500                  Reference Point Last Treated On Elapsed Days Most Recent Session Dose (cGy) Total Dose (cGy)    SBRT RUL Lung 8/1/2023 1 @ [id number] 1,100 2,200                            STEREOTACTIC PROCEDURE NOTE:    Called by Truebeam machine to verify treatment parameters including:  treatment site, treatment dose, and treatment setup prior to stereotactic treatment..    Patient was placed in the treatment position with use of immobilization device and  laser guidance. CBCT images were acquired for target localization.  Images were reviewed in the axial, coronal, and saggital views and shifts were made as necessary to ensure that patient position matched simulation position.      Treatment delivered per  prescription.  The medical physicist was present throughout the set-up, verification and treatment delivery to oversee the procedure and ensure all parameters agreed with the computerized plan.    I have personally reviewed the relevant data, performed the target localization, and determined all relevant factors for this patient’s simulation.

## 2023-08-02 ENCOUNTER — HOSPITAL ENCOUNTER (OUTPATIENT)
Dept: RADIATION ONCOLOGY | Facility: MEDICAL CENTER | Age: 78
End: 2023-08-02

## 2023-08-02 LAB
CHEMOTHERAPY INFUSION START DATE: NORMAL
CHEMOTHERAPY RECORDS: 11
CHEMOTHERAPY RECORDS: 5500
CHEMOTHERAPY RECORDS: NORMAL
CHEMOTHERAPY RX CANCER: NORMAL
DATE 1ST CHEMO CANCER: NORMAL
RAD ONC ARIA COURSE LAST TREATMENT DATE: NORMAL
RAD ONC ARIA COURSE TREATMENT ELAPSED DAYS: NORMAL
RAD ONC ARIA REFERENCE POINT DOSAGE GIVEN TO DATE: 33
RAD ONC ARIA REFERENCE POINT ID: NORMAL
RAD ONC ARIA REFERENCE POINT SESSION DOSAGE GIVEN: 11

## 2023-08-02 PROCEDURE — 77280 THER RAD SIMULAJ FIELD SMPL: CPT | Performed by: RADIOLOGY

## 2023-08-02 PROCEDURE — 77336 RADIATION PHYSICS CONSULT: CPT | Mod: XU | Performed by: RADIOLOGY

## 2023-08-02 PROCEDURE — 77373 STRTCTC BDY RAD THER TX DLVR: CPT | Performed by: RADIOLOGY

## 2023-08-02 PROCEDURE — 77280 THER RAD SIMULAJ FIELD SMPL: CPT | Mod: 26 | Performed by: RADIOLOGY

## 2023-08-02 NOTE — PROCEDURES
DATE OF SERVICE: 8/2/2023    DIAGNOSIS:  Primary adenocarcinoma of upper lobe of right lung (HCC)  Staging form: Lung, AJCC 8th Edition  - Clinical stage from 7/17/2023: Stage IA2 (cT1b, cN0, cM0) - Signed by Efrem Davis M.D. on 7/17/2023  Histopathologic type: Adenocarcinoma, NOS  Stage prefix: Initial diagnosis       TREATMENT:  Radiation Therapy Episodes       Active Episodes       Radiation Therapy: SBRT (7/31/2023)                   Radiation Treatments         Plan Last Treated On Elapsed Days Fractions Treated Prescribed Fraction Dose (cGy) Prescribed Total Dose (cGy)    SBRT RUL Lung 8/2/2023 2 @ [id number] 3 of 5 1,100 5,500                  Reference Point Last Treated On Elapsed Days Most Recent Session Dose (cGy) Total Dose (cGy)    SBRT RUL Lung 8/2/2023 2 @ [id number] 1,100 3,300                            STEREOTACTIC PROCEDURE NOTE:    Called by Truebeam machine to verify treatment parameters including:  treatment site, treatment dose, and treatment setup prior to stereotactic treatment..    Patient was placed in the treatment position with use of immobilization device and  laser guidance. CBCT images were acquired for target localization.  Images were reviewed in the axial, coronal, and saggital views and shifts were made as necessary to ensure that patient position matched simulation position.      Treatment delivered per  prescription.  The medical physicist was present throughout the set-up, verification and treatment delivery to oversee the procedure and ensure all parameters agreed with the computerized plan.    I have personally reviewed the relevant data, performed the target localization, and determined all relevant factors for this patient’s simulation.

## 2023-08-03 ENCOUNTER — HOSPITAL ENCOUNTER (OUTPATIENT)
Dept: RADIATION ONCOLOGY | Facility: MEDICAL CENTER | Age: 78
End: 2023-08-03

## 2023-08-03 LAB
CHEMOTHERAPY INFUSION START DATE: NORMAL
CHEMOTHERAPY RECORDS: 11
CHEMOTHERAPY RECORDS: 5500
CHEMOTHERAPY RECORDS: NORMAL
CHEMOTHERAPY RX CANCER: NORMAL
DATE 1ST CHEMO CANCER: NORMAL
RAD ONC ARIA COURSE LAST TREATMENT DATE: NORMAL
RAD ONC ARIA COURSE TREATMENT ELAPSED DAYS: NORMAL
RAD ONC ARIA REFERENCE POINT DOSAGE GIVEN TO DATE: 44
RAD ONC ARIA REFERENCE POINT ID: NORMAL
RAD ONC ARIA REFERENCE POINT SESSION DOSAGE GIVEN: 11

## 2023-08-03 PROCEDURE — 77373 STRTCTC BDY RAD THER TX DLVR: CPT | Performed by: RADIOLOGY

## 2023-08-03 PROCEDURE — 77280 THER RAD SIMULAJ FIELD SMPL: CPT | Performed by: RADIOLOGY

## 2023-08-03 PROCEDURE — 77280 THER RAD SIMULAJ FIELD SMPL: CPT | Mod: 26 | Performed by: RADIOLOGY

## 2023-08-03 NOTE — PROCEDURES
DATE OF SERVICE: 8/3/2023    DIAGNOSIS:  Primary adenocarcinoma of upper lobe of right lung (HCC)  Staging form: Lung, AJCC 8th Edition  - Clinical stage from 7/17/2023: Stage IA2 (cT1b, cN0, cM0) - Signed by Efrem Davis M.D. on 7/17/2023  Histopathologic type: Adenocarcinoma, NOS  Stage prefix: Initial diagnosis       TREATMENT:  Radiation Therapy Episodes       Active Episodes       Radiation Therapy: SBRT (7/31/2023)                   Radiation Treatments         Plan Last Treated On Elapsed Days Fractions Treated Prescribed Fraction Dose (cGy) Prescribed Total Dose (cGy)    SBRT RUL Lung 8/3/2023 3 @ [id number] 4 of 5 1,100 5,500                  Reference Point Last Treated On Elapsed Days Most Recent Session Dose (cGy) Total Dose (cGy)    SBRT RUL Lung 8/3/2023 3 @ [id number] 1,100 4,400                            STEREOTACTIC PROCEDURE NOTE:    Called by Truebeam machine to verify treatment parameters including:  treatment site, treatment dose, and treatment setup prior to stereotactic treatment..    Patient was placed in the treatment position with use of immobilization device and  laser guidance. CBCT images were acquired for target localization.  Images were reviewed in the axial, coronal, and saggital views and shifts were made as necessary to ensure that patient position matched simulation position.      Treatment delivered per  prescription.  The medical physicist was present throughout the set-up, verification and treatment delivery to oversee the procedure and ensure all parameters agreed with the computerized plan.    I have personally reviewed the relevant data, performed the target localization, and determined all relevant factors for this patient’s simulation.

## 2023-08-04 ENCOUNTER — HOSPITAL ENCOUNTER (OUTPATIENT)
Dept: RADIATION ONCOLOGY | Facility: MEDICAL CENTER | Age: 78
End: 2023-08-04

## 2023-08-04 LAB
CHEMOTHERAPY INFUSION START DATE: NORMAL
CHEMOTHERAPY INFUSION START DATE: NORMAL
CHEMOTHERAPY INFUSION STOP DATE: NORMAL
CHEMOTHERAPY RECORDS: 11
CHEMOTHERAPY RECORDS: 11
CHEMOTHERAPY RECORDS: 5500
CHEMOTHERAPY RECORDS: 5500
CHEMOTHERAPY RECORDS: NORMAL
CHEMOTHERAPY RX CANCER: NORMAL
CHEMOTHERAPY RX CANCER: NORMAL
DATE 1ST CHEMO CANCER: NORMAL
DATE 1ST CHEMO CANCER: NORMAL
RAD ONC ARIA COURSE LAST TREATMENT DATE: NORMAL
RAD ONC ARIA COURSE LAST TREATMENT DATE: NORMAL
RAD ONC ARIA COURSE TREATMENT ELAPSED DAYS: NORMAL
RAD ONC ARIA COURSE TREATMENT ELAPSED DAYS: NORMAL
RAD ONC ARIA REFERENCE POINT DOSAGE GIVEN TO DATE: 55
RAD ONC ARIA REFERENCE POINT DOSAGE GIVEN TO DATE: 55
RAD ONC ARIA REFERENCE POINT ID: NORMAL
RAD ONC ARIA REFERENCE POINT ID: NORMAL
RAD ONC ARIA REFERENCE POINT SESSION DOSAGE GIVEN: 11

## 2023-08-04 PROCEDURE — 77280 THER RAD SIMULAJ FIELD SMPL: CPT | Performed by: RADIOLOGY

## 2023-08-04 PROCEDURE — 77373 STRTCTC BDY RAD THER TX DLVR: CPT | Performed by: RADIOLOGY

## 2023-08-04 PROCEDURE — 77280 THER RAD SIMULAJ FIELD SMPL: CPT | Mod: 26 | Performed by: RADIOLOGY

## 2023-08-04 NOTE — ADDENDUM NOTE
Encounter addended by: Isabella Walters, Med Ass't on: 8/4/2023 2:33 PM   Actions taken: Pend clinical note

## 2023-08-04 NOTE — PROCEDURES
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 on 7/17/2023  Histopathologic type: Adenocarcinoma, NOS  Stage prefix: Initial diagnosis       TREATMENT:  Radiation Therapy Episodes       Active Episodes       Radiation Therapy: SBRT (7/31/2023)                   Radiation Treatments         Plan Last Treated On Elapsed Days Fractions Treated Prescribed Fraction Dose (cGy) Prescribed Total Dose (cGy)    SBRT RUL Lung 8/4/2023 4 @ [id number] 5 of 5 1,100 5,500                  Reference Point Last Treated On Elapsed Days Most Recent Session Dose (cGy) Total Dose (cGy)    SBRT RUL Lung 8/4/2023 4 @ [id number] 1,100 5,500                            STEREOTACTIC PROCEDURE NOTE:    Called by Truebeam machine to verify treatment parameters including:  treatment site, treatment dose, and treatment setup prior to stereotactic treatment..    Patient was placed in the treatment position with use of immobilization device and  laser guidance. CBCT images were acquired for target localization.  Images were reviewed in the axial, coronal, and saggital views and shifts were made as necessary to ensure that patient position matched simulation position.      Treatment delivered per  prescription.  The medical physicist was present throughout the set-up, verification and treatment delivery to oversee the procedure and ensure all parameters agreed with the computerized plan.    I have personally reviewed the relevant data, performed the target localization, and determined all relevant factors for this patient’s simulation.

## 2023-08-04 NOTE — RADIATION COMPLETION NOTES
END OF TREATMENT SUMMARY    Patient name:  Steve Cates    Primary Physician:  Pcp Not In Computer MRN: [id number]  CSN: [id number]   Referring physician:  Gaby Salazar M.D : 1945, 78 y.o.       TREATMENT SUMMARY:        Course First Treatment Date 2023    Course Last Treatment Date 2023   Course Elapsed Days 4 @ [id number]   Course Intent Curative     Radiation Therapy Episodes       Active Episodes       Radiation Therapy: SBRT (2023)                   Radiation Treatments         Plan Last Treated On Elapsed Days Fractions Treated Prescribed Fraction Dose (cGy) Prescribed Total Dose (cGy)    SBRT RUL Lung 2023 4 @ [id number] 5 of 5 1,100 5,500                  Reference Point Last Treated On Elapsed Days Most Recent Session Dose (cGy) Total Dose (cGy)    SBRT RUL Lung 2023 4 @ [id number] 1,100 5,500                                     STAGE:   Primary adenocarcinoma of upper lobe of right lung (HCC)  Staging form: Lung, AJCC 8th Edition  - Clinical stage from 2023: Stage IA2 (cT1b, cN0, cM0) - Signed by Efrem Davis M.D. on 2023  Histopathologic type: Adenocarcinoma, NOS  Stage prefix: Initial diagnosis       TREATMENT INDICATION:   Definitive SBRT     CONCURRENT SYSTEMIC TREATMENT: None     RT COURSE DISCONTINUED EARLY:   No     PATIENT EXPERIENCE:        No data to display                 FOLLOW-UP PLAN:   6 Weeks     COMMENT:          ANATOMIC TARGET SUMMARY    ANATOMIC TARGET MODALITY TECHNIQUE   RUL primary lesion   External beam, photons SBRT            COMMENT:         DIAGRAMS:      DOSE VOLUME HISTOGRAMS:

## 2023-09-14 ENCOUNTER — HOSPITAL ENCOUNTER (OUTPATIENT)
Dept: RADIATION ONCOLOGY | Facility: MEDICAL CENTER | Age: 78
End: 2023-09-30
Attending: RADIOLOGY
Payer: COMMERCIAL

## 2023-09-14 DIAGNOSIS — C34.11 PRIMARY ADENOCARCINOMA OF UPPER LOBE OF RIGHT LUNG (HCC): ICD-10-CM

## 2023-09-14 NOTE — PROGRESS NOTES
This visit is being conducted by telephone. This telephone visit was initiated by the patient and they verbally consented. Patient at home in [facility].      Reason for Call:  Post tx FU    Treatment History:     Radiation Oncology          8/3/2023 8/4/2023   [facility]ria Course Treatment Dates   Course First Treatment Date 07/31/2023 07/31/2023    Course Last Treatment Date 08/03/2023 08/04/2023    Aria Treatment Summary   SBRT RUL Lung  Plan from Course C1_RUL SBRT   Fraction 4 of 5 5 of 5    5 of 5   Elapsed Course Days 3 @ [id number] 4 @ [id number]    4 @ [id number]   Prescribed Fraction Dose 1,100 cGy 1,100 cGy    1,100 cGy   Prescribed Total Dose 5,500 cGy 5,500 cGy    5,500 cGy   SBRT RUL Lung  Reference Point from Course C1_RUL SBRT   Elapsed Course Days 3 @ [id number] 4 @ [id number]    4 @ [id number]   Session Dose 1,100 cGy 1,100 cGy    --   Total Dose 4,400 cGy 5,500 cGy    5,500 cGy      Details          More values are hidden. Newest values shown. Go to activity for more data.                HPI:    Subjective    This is a 77-year-old gentleman who comes to discuss treatment options for his early-stage lung cancer.  In brief, his history dates back to December 2022 when he was noted to have an enlarging nodule in the right upper lobe has been followed since March 2021.  As a result and given his prior smoking history, this was worked up with a PET scan that found it to be slightly larger than prior measuring 1.5 cm with a relatively low uptake of SUV 1.3.  No other disease was present.     [facility] at which point I had felt that given that it was relatively small and not PET avid, which ideally proceed with biopsy if this was suspicious.  This was ultimately completed on June 21 that confirmed non-small cell lung cancer, adenocarcinoma.  He met with Dr. Rao, and reviewed treatment options including surgery versus SBRT.  I do not have recent PFTs, but he  did [facility], DLCO was 49%.  He was felt to be relatively reasonable candidate for surgery or radiation, and wanted to discuss radiation further for which he presents today.  He does not have any major cardiopulmonary complaints, and does not have any pain or local symptoms of the location of the nodule.  He does say that he does not exert himself significantly so as to not have significant dyspnea or limitations, and his most significant physical activity is mowing the lawn which is tolerable for him.  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            Interval History:   9/14/23 Doing well post SBRT, no major concerns today, breathing stable, no chest wall pain, looking forward to followup.    Labs / Images Reviewed:   No results found.    Assessment:   Primary adenocarcinoma of upper lobe of right lung (HCC)  Staging form: Lung, AJCC 8th Edition  - Clinical stage from 7/17/2023: Stage IA2 (cT1b, cN0, cM0) - Signed by Efrem Davis M.D. on 7/17/2023  Histopathologic type: Adenocarcinoma, NOS  Stage prefix: Initial diagnosis      Cancer Status:   Therapy Completed, Status Pending Restaging Work-up    Plan:   We will proceed with CT chest and FU in about 5-6 weeks.    Time Spent on Call: 5 min      Time Spent in Preparation: 5 min    Total Time Spent: 10 min    Efrem Davis M.D.

## 2023-10-16 ENCOUNTER — HOSPITAL ENCOUNTER (OUTPATIENT)
Dept: RADIOLOGY | Facility: MEDICAL CENTER | Age: 78
End: 2023-10-16
Attending: RADIOLOGY
Payer: COMMERCIAL

## 2023-10-16 DIAGNOSIS — C34.11 PRIMARY ADENOCARCINOMA OF UPPER LOBE OF RIGHT LUNG (HCC): ICD-10-CM

## 2023-10-16 PROCEDURE — 71250 CT THORAX DX C-: CPT

## 2023-10-26 ENCOUNTER — HOSPITAL ENCOUNTER (OUTPATIENT)
Dept: RADIATION ONCOLOGY | Facility: MEDICAL CENTER | Age: 78
End: 2023-10-31
Attending: RADIOLOGY
Payer: COMMERCIAL

## 2023-10-26 DIAGNOSIS — C34.11 PRIMARY ADENOCARCINOMA OF UPPER LOBE OF RIGHT LUNG (HCC): ICD-10-CM

## 2023-10-26 NOTE — PROGRESS NOTES
This visit is being conducted by telephone. This telephone visit was initiated by the patient and they verbally consented. Patient at home in [facility].      Reason for Call: Posttreatment follow-up    Treatment History:     Radiation Oncology          8/3/2023 8/4/2023   Aria Course Treatment Dates   Course First Treatment Date 07/31/2023 07/31/2023    Course Last Treatment Date 08/03/2023 08/04/2023    Aria Treatment Summary   SBRT RUL Lung  Plan from Course C1_RUL SBRT   Fraction 4 of 5 5 of 5    5 of 5   Elapsed Course Days 3 @ [id number] 4 @ [id number]    4 @ [id number]   Prescribed Fraction Dose 1,100 cGy 1,100 cGy    1,100 cGy   Prescribed Total Dose 5,500 cGy 5,500 cGy    5,500 cGy   SBRT RUL Lung  Reference Point from Course C1_RUL SBRT   Elapsed Course Days 3 @ [id number] 4 @ [id number]    4 @ [id number]   Session Dose 1,100 cGy 1,100 cGy    --   Total Dose 4,400 cGy 5,500 cGy    5,500 cGy      Details          More values are hidden. Newest values shown. Go to activity for more data.                HPI:    Subjective    This is a 77-year-old gentleman who comes to discuss treatment options for his early-stage lung cancer.  In brief, his history dates back to December 2022 when he was noted to have an enlarging nodule in the right upper lobe has been followed since March 2021.  As a result and given his prior smoking history, this was worked up with a PET scan that found it to be slightly larger than prior measuring 1.5 cm with a relatively low uptake of SUV 1.3.  No other disease was present.     [facility], at which point I had felt that given that it was relatively small and not PET avid, which ideally proceed with biopsy if this was suspicious.  This was ultimately completed on June 21 that confirmed non-small cell lung cancer, adenocarcinoma.  He met with Dr. Rao, and reviewed treatment options including surgery versus SBRT.  I do not have recent  PFTs, but he did [facility], DLCO was 49%.  He was felt to be relatively reasonable candidate for surgery or radiation, and wanted to discuss radiation further for which he presents today.  He does not have any major cardiopulmonary complaints, and does not have any pain or local symptoms of the location of the nodule.  He does say that he does not exert himself significantly so as to not have significant dyspnea or limitations, and his most significant physical activity is mowing the lawn which is tolerable for him.  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        9/14/23 Doing well post SBRT, no major concerns today, breathing stable, no chest wall pain, looking forward to followup.       Interval History:   10/26/23 we are having a telephone follow-up today after his recent CT of the chest.  He is doing well, no major concerns, breathing is stable, no chest wall pain or any other complaints.  He is eager to review his CT.    Labs / Images Reviewed:   CT-CHEST (THORAX) W/O    Result Date: 10/17/2023  1. Slightly increased, now 16 x 10 mm spiculated right upper lobe pulmonary nodule. 2. No mediastinal adenopathy. 3. Stable 5 mm liver lesion. Therefore, likely a small benign hemangioma or cyst. 4. Small bilateral nonobstructing renal stones. 5. Stable 1.6 cm left thyroid lobe cyst versus nodule. 6. Old bilateral rib fractures. Fleischner Society pulmonary nodule recommendations: Not Applicable       Assessment:   Primary adenocarcinoma of upper lobe of right lung (HCC)  Staging form: Lung, AJCC 8th Edition  - Clinical stage from 7/17/2023: Stage IA2 (cT1b, cN0, cM0) - Signed by Efrme Davis M.D. on 7/17/2023  Histopathologic type: Adenocarcinoma, NOS  Stage prefix: Initial diagnosis      Cancer Status:   Disease Partial Response    Plan:   I reviewed the scan personally.  There is some  associated posttreatment related fibrosis in the area, and therefore the treated area measures slightly larger than previously, though I think this is all consistent with treatment effects right now.  We need to continue to monitor this certainly on an ongoing basis, but overall the scan is not concerning at this time.  I relayed all of this to him today.  We will plan to continue surveillance with routine CT chest in another 3 months and a follow-up at that time.    Time Spent on Call: 10 min      Time Spent in Preparation: 5 min    Total Time Spent: 15 min    Efrem Davis M.D.

## 2024-01-26 ENCOUNTER — APPOINTMENT (OUTPATIENT)
Dept: RADIOLOGY | Facility: MEDICAL CENTER | Age: 79
End: 2024-01-26
Attending: RADIOLOGY
Payer: COMMERCIAL

## 2024-01-26 DIAGNOSIS — C34.11 PRIMARY ADENOCARCINOMA OF UPPER LOBE OF RIGHT LUNG (HCC): ICD-10-CM

## 2024-01-26 PROCEDURE — 71250 CT THORAX DX C-: CPT

## 2024-02-01 ENCOUNTER — HOSPITAL ENCOUNTER (OUTPATIENT)
Dept: RADIATION ONCOLOGY | Facility: MEDICAL CENTER | Age: 79
End: 2024-02-29
Attending: RADIOLOGY
Payer: COMMERCIAL

## 2024-02-01 VITALS
HEART RATE: 61 BPM | WEIGHT: 154.1 LBS | OXYGEN SATURATION: 96 % | DIASTOLIC BLOOD PRESSURE: 107 MMHG | BODY MASS INDEX: 23.78 KG/M2 | SYSTOLIC BLOOD PRESSURE: 149 MMHG

## 2024-02-01 DIAGNOSIS — C34.11 PRIMARY ADENOCARCINOMA OF UPPER LOBE OF RIGHT LUNG (HCC): ICD-10-CM

## 2024-02-01 PROCEDURE — 99213 OFFICE O/P EST LOW 20 MIN: CPT | Performed by: RADIOLOGY

## 2024-02-01 PROCEDURE — 99212 OFFICE O/P EST SF 10 MIN: CPT | Performed by: RADIOLOGY

## 2024-02-01 ASSESSMENT — PAIN SCALES - GENERAL: PAINLEVEL: NO PAIN

## 2024-02-01 NOTE — PROGRESS NOTES
RADIATION ONCOLOGY FOLLOW-UP    Patient name:  Steve Cates    Primary Physician:  Pcp Not In Computer MRN: [id number]  CSN: [id number]   Referring physician:  Gaby Rao M.D.  : 1945, 78 y.o.     DATE OF SERVICE: 2024    IDENTIFICATION:   A 78 y.o. 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 on 2023  Histopathologic type: Adenocarcinoma, NOS  Stage prefix: Initial diagnosis      RADIATION SUMMARY:  Radiation Oncology          8/3/2023 2023   Aria Course Treatment Dates   Course First Treatment Date 2023    Course Last Treatment Date 2023    Aria Treatment Summary   SBRT RUL Lung  Plan from Course C1_RUL SBRT   Fraction 4 of 5 5 of 5    5 of 5   Elapsed Course Days 3 @ [id number] 4 @ [id number]    4 @ [id number]   Prescribed Fraction Dose 1,100 cGy 1,100 cGy    1,100 cGy   Prescribed Total Dose 5,500 cGy 5,500 cGy    5,500 cGy   SBRT RUL Lung  Reference Point from Course C1_RUL SBRT   Elapsed Course Days 3 @ [id number] 4 @ [id number]    4 @ [id number]   Session Dose 1,100 cGy 1,100 cGy    --   Total Dose 4,400 cGy 5,500 cGy    5,500 cGy      Details          More values are hidden. Newest values shown. Go to activity for more data.                HISTORY OF PRESENT ILLNESS:  Subjective      This is a 77-year-old gentleman who comes to discuss treatment options for his early-stage lung cancer.  In brief, his history dates back to 2022 when he was noted to have an enlarging nodule in the right upper lobe has been followed since 2021.  As a result and given his prior smoking history, this was worked up with a PET scan that found it to be slightly larger than prior measuring 1.5 cm with a relatively low uptake of SUV 1.3.  No other disease was present.     [facility], at which point I had felt that given that it was relatively small and not PET avid, which ideally proceed with biopsy if this was suspicious.  This was ultimately completed on June 21 that confirmed non-small cell lung cancer, adenocarcinoma.  He met with Dr. Rao, and reviewed treatment options including surgery versus SBRT.  I do not have recent PFTs, but he did [facility], DLCO was 49%.  He was felt to be relatively reasonable candidate for surgery or radiation, and wanted to discuss radiation further for which he presents today.  He does not have any major cardiopulmonary complaints, and does not have any pain or local symptoms of the location of the nodule.  He does say that he does not exert himself significantly so as to not have significant dyspnea or limitations, and his most significant physical activity is mowing the lawn which is tolerable for him.  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          9/14/23 Doing well post SBRT, no major concerns today, breathing stable, no chest wall pain, looking forward to followup.    10/26/23 we are having a telephone follow-up today after his recent CT of the chest.  He is doing well, no major concerns, breathing is stable, no chest wall pain or any other complaints.  He is eager to review his CT.           INTERVAL HISTORY:  2/1/24 he is doing great.  No questions or concerns today.  Feeling very well.  No cardiopulmonary complaints or chest wall pain.  Breathing is stable.      PROBLEM LIST:  Patient Active Problem List   Diagnosis    HTN (hypertension)    Hyperlipidemia    Hereditary hemochromatosis (HCC)    History of colon polyps    Actinic keratosis    Primary adenocarcinoma of upper lobe of right lung (HCC)       CURRENT MEDICATIONS:  Current Outpatient Medications   Medication Sig Dispense Refill    carvedilol  "(COREG) 3.125 MG Tab Take 3.125 mg by mouth 2 times a day with meals.      allopurinol (ZYLOPRIM) 100 MG Tab Take 100 mg by mouth every day.      magnesium oxide (MAG-OX) 400 MG Tab tablet Take 400 mg by mouth every day.      Cobalamine Combinations (LIVER-B-12 PO) Take  by mouth.      POTASSIUM PO Take  by mouth.      triamterene/hctz (MAXZIDE-25/DYAZIDE) 37.5-25 MG CAPS Take 1 Cap by mouth every morning. 90 Cap 3    Cholecalciferol (VITAMIN D PO) Take  by mouth.      FOLIC ACID PO Take  by mouth.      simvastatin (ZOCOR) 10 MG TABS Take 10 mg by mouth every evening.      sildenafil citrate (VIAGRA) 100 MG tablet Take 0.5-1 Tabs by mouth as needed for Erectile Dysfunction. (Patient not taking: Reported on 2/1/2024) 10 Tab 3    aspirin EC (ECOTRIN) 81 MG TBEC Take 81 mg by mouth every day. (Patient not taking: Reported on 7/17/2023)       No current facility-administered medications for this encounter.       ALLERGIES:  Lisinopril    REVIEW OF SYSTEMS:    A complete review of system taken. Pertinent items in HPI.  All others negative.    PHYSICAL EXAM:  PERFORMANCE STATUS:       No data to display                   No data to display              BP (!) 149/107 (BP Location: Left arm, Patient Position: Sitting, BP Cuff Size: Adult)   Pulse 61   Wt 69.9 kg (154 lb 1.6 oz)   SpO2 96%   BMI 23.78 kg/m²   Physical Exam  Constitutional:       Appearance: Normal appearance.   Cardiovascular:      Rate and Rhythm: Normal rate and regular rhythm.   Pulmonary:      Effort: Pulmonary effort is normal. No respiratory distress.   Neurological:      General: No focal deficit present.      Mental Status: He is alert and oriented to person, place, and time.         LABORATORY DATA:   No results found for: \"WBC\", \"RBC\", \"HEMOGLOBIN\", \"HEMATOCRIT\", \"MCV\", \"MCH\", \"MCHC\", \"RDW\", \"PLATELETCT\", \"MPV\", \"NEUTSPOLYS\", \"LYMPHOCYTES\", \"MONOCYTES\", \"EOSINOPHILS\", \"BASOPHILS\", \"HYPOCHROMIA\", \"ANISOCYTOSIS\"   No results found for: \"SODIUM\", " "\"POTASSIUM\", \"CHLORIDE\", \"CO2\", \"GLUCOSE\", \"BUN\", \"CREATININE\", \"BUNCREATRAT\", \"GLOMRATE\"      RADIOLOGY DATA:  CT-CHEST (THORAX) W/O    Result Date: 1/26/2024  1.  Interval increase in size of mass with spiculated margins anterior right upper pulmonary lobe that now abuts the pleural surface. Findings are consistent with lung neoplasm. 2.  Opacification lateral right lung base could be due to pneumonia or atelectasis and is located adjacent to a small amount of loculated pleural fluid. Neoplasm is possible but less likely. 3.  Minimal right pleural effusion. Fleischner Society pulmonary nodule recommendations: Not Applicable       IMPRESSION:    A 78 y.o. 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 on 7/17/2023  Histopathologic type: Adenocarcinoma, NOS  Stage prefix: Initial diagnosis      CANCER STATUS:  Disease Stable    RECOMMENDATIONS:   Steve is doing very well.  He has essentially no complaints today symptomatically.  I reviewed the images with him in detail.  Though the read says interval increase in size of the mass, I think these are all consistent with treatment related changes.  There is likely fibrosis in the area of SBRT and given the proximity of the pleura and the scarring to be expected after radiation, I think we can continue with close observation as the scan is otherwise relatively reassuring.  I am not confident that these are truly worsening cancer related changes.  Therefore for now, we will continue with a CT chest and follow-up in 3 months, which she is very comfortable with.  If he has any questions or concerns in the interim or sooner, I am certainly happy to see him back at any time.    Thank you for the opportunity to participate in his care.  If any questions or comments, please do not hesitate in calling.    No orders of the " defined types were placed in this encounter.

## 2024-02-01 NOTE — PROGRESS NOTES
Patient was seen today in clinic with Dr. Davis for follow up.  Vitals signs and weight were obtained and pain assessment was completed.  Allergies and medications were reviewed with the patient.  Toxicities of treatment assessed.     Vitals/Pain:  Vitals:    02/01/24 1405   BP: (!) 149/107   BP Location: Left arm   Patient Position: Sitting   BP Cuff Size: Adult   Pulse: 61   SpO2: 96%   Weight: 69.9 kg (154 lb 1.6 oz)   Pain Score: No pain        Allergies:   Lisinopril    Current Medications:  Current Outpatient Medications   Medication Sig Dispense Refill    carvedilol (COREG) 3.125 MG Tab Take 3.125 mg by mouth 2 times a day with meals.      allopurinol (ZYLOPRIM) 100 MG Tab Take 100 mg by mouth every day.      magnesium oxide (MAG-OX) 400 MG Tab tablet Take 400 mg by mouth every day.      Cobalamine Combinations (LIVER-B-12 PO) Take  by mouth.      POTASSIUM PO Take  by mouth.      triamterene/hctz (MAXZIDE-25/DYAZIDE) 37.5-25 MG CAPS Take 1 Cap by mouth every morning. 90 Cap 3    Cholecalciferol (VITAMIN D PO) Take  by mouth.      FOLIC ACID PO Take  by mouth.      simvastatin (ZOCOR) 10 MG TABS Take 10 mg by mouth every evening.      sildenafil citrate (VIAGRA) 100 MG tablet Take 0.5-1 Tabs by mouth as needed for Erectile Dysfunction. (Patient not taking: Reported on 2/1/2024) 10 Tab 3    aspirin EC (ECOTRIN) 81 MG TBEC Take 81 mg by mouth every day. (Patient not taking: Reported on 7/17/2023)       No current facility-administered medications for this encounter.         PCP:  Pcp Not In Computer        Ian Syed Ass't

## 2024-05-01 ENCOUNTER — HOSPITAL ENCOUNTER (OUTPATIENT)
Dept: RADIOLOGY | Facility: MEDICAL CENTER | Age: 79
End: 2024-05-01
Attending: RADIOLOGY
Payer: COMMERCIAL

## 2024-05-01 DIAGNOSIS — C34.11 PRIMARY ADENOCARCINOMA OF UPPER LOBE OF RIGHT LUNG (HCC): ICD-10-CM

## 2024-05-06 ENCOUNTER — HOSPITAL ENCOUNTER (OUTPATIENT)
Dept: RADIATION ONCOLOGY | Facility: MEDICAL CENTER | Age: 79
End: 2024-05-06
Attending: RADIOLOGY
Payer: COMMERCIAL

## 2024-05-06 VITALS
DIASTOLIC BLOOD PRESSURE: 67 MMHG | HEART RATE: 55 BPM | BODY MASS INDEX: 23.54 KG/M2 | SYSTOLIC BLOOD PRESSURE: 176 MMHG | TEMPERATURE: 97.3 F | OXYGEN SATURATION: 96 % | WEIGHT: 152.56 LBS

## 2024-05-06 DIAGNOSIS — C34.11 PRIMARY ADENOCARCINOMA OF UPPER LOBE OF RIGHT LUNG (HCC): ICD-10-CM

## 2024-05-06 ASSESSMENT — PAIN SCALES - GENERAL: PAINLEVEL: NO PAIN

## 2024-05-06 NOTE — PROGRESS NOTES
RADIATION ONCOLOGY FOLLOW-UP    Patient name:  Steve Cates    Primary Physician:  Pcp Not In Computer MRN: [id number]  [facility]: [id number]   Referring physician:  Gaby Rao M.D.  : 1945, 78 y.o.     DATE OF SERVICE: 2024    IDENTIFICATION:   A 78 y.o. 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 on 2023  Histopathologic type: Adenocarcinoma, NOS  Stage prefix: Initial diagnosis      RADIATION SUMMARY:  Radiation Oncology          8/3/2023 2023   Aria Course Treatment Dates   Course First Treatment Date 2023    Course Last Treatment Date 2023    Aria Treatment Summary   SBRT RUL Lung  Plan from Course C1_RUL SBRT   Fraction 4 of 5 5 of 5    5 of 5   Elapsed Course Days 3 @ [id number] 4 @ [id number]    4 @ [id number]   Prescribed Fraction Dose 1,100 cGy 1,100 cGy    1,100 cGy   Prescribed Total Dose 5,500 cGy 5,500 cGy    5,500 cGy   SBRT RUL Lung  Reference Point from Course C1_RUL SBRT   Elapsed Course Days 3 @ [id number] 4 @ [id number]    4 @ [id number]   Session Dose 1,100 cGy 1,100 cGy    --   Total Dose 4,400 cGy 5,500 cGy    5,500 cGy      Details          More values are hidden. Newest values shown. Go to activity for more data.                HISTORY OF PRESENT ILLNESS:  Subjective      This is a 77-year-old gentleman who comes to discuss treatment options for his early-stage lung cancer.  In brief, his history dates back to 2022 when he was noted to have an enlarging nodule in the right upper lobe has been followed since 2021.  As a result and given his prior smoking history, this was worked up with a PET scan that found it to be slightly larger than prior measuring 1.5 cm with a relatively low uptake of SUV 1.3.  No other disease was present.     [facility] at which point I had felt that given that it was relatively small and not PET avid, which ideally proceed with biopsy if this was suspicious.  This was ultimately completed on June 21 that confirmed non-small cell lung cancer, adenocarcinoma.  He met with Dr. Rao, and reviewed treatment options including surgery versus SBRT.  I do not have recent PFTs, but [facility], DLCO was 49%.  He was felt to be relatively reasonable candidate for surgery or radiation, and wanted to discuss radiation further for which he presents today.  He does not have any major cardiopulmonary complaints, and does not have any pain or local symptoms of the location of the nodule.  He does say that he does not exert himself significantly so as to not have significant dyspnea or limitations, and his most significant physical activity is mowing the lawn which is tolerable for him.  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          9/14/23 Doing well post SBRT, no major concerns today, breathing stable, no chest wall pain, looking forward to followup.     10/26/23 we are having a telephone follow-up today after his recent CT of the chest.  He is doing well, no major concerns, breathing is stable, no chest wall pain or any other complaints.  He is eager to review his CT.      2/1/24 he is doing great.  No questions or concerns today.  Feeling very well.  No cardiopulmonary complaints or chest wall pain.  Breathing is stable.          INTERVAL HISTORY:  5/6/2024 he is doing well.  No chest wall pain.  No cardiopulmonary complaints.  Breathing is stable.  Looking forward to reviewing the recent CT chest.    PROBLEM LIST:  Patient Active Problem List   Diagnosis    HTN (hypertension)    Hyperlipidemia    Hereditary hemochromatosis (HCC)    History of colon polyps    Actinic keratosis    Primary  adenocarcinoma of upper lobe of right lung (HCC)       CURRENT MEDICATIONS:  Current Outpatient Medications   Medication Sig Dispense Refill    carvedilol (COREG) 3.125 MG Tab Take 3.125 mg by mouth 2 times a day with meals.      allopurinol (ZYLOPRIM) 100 MG Tab Take 100 mg by mouth every day.      magnesium oxide (MAG-OX) 400 MG Tab tablet Take 400 mg by mouth every day.      Cobalamine Combinations (LIVER-B-12 PO) Take  by mouth.      sildenafil citrate (VIAGRA) 100 MG tablet Take 0.5-1 Tabs by mouth as needed for Erectile Dysfunction. (Patient not taking: Reported on 2/1/2024) 10 Tab 3    POTASSIUM PO Take  by mouth.      triamterene/hctz (MAXZIDE-25/DYAZIDE) 37.5-25 MG CAPS Take 1 Cap by mouth every morning. 90 Cap 3    aspirin EC (ECOTRIN) 81 MG TBEC Take 81 mg by mouth every day. (Patient not taking: Reported on 7/17/2023)      Cholecalciferol (VITAMIN D PO) Take  by mouth.      FOLIC ACID PO Take  by mouth.      simvastatin (ZOCOR) 10 MG TABS Take 10 mg by mouth every evening.       No current facility-administered medications for this encounter.       ALLERGIES:  Lisinopril    REVIEW OF SYSTEMS:    A complete review of system taken. Pertinent items in HPI.  All others negative.    PHYSICAL EXAM:  PERFORMANCE STATUS:       No data to display                   No data to display              BP (!) 176/67 (BP Location: Left arm, Patient Position: Sitting, BP Cuff Size: Adult)   Pulse (!) 55   Temp 36.3 °C (97.3 °F) (Temporal)   Wt 69.2 kg (152 lb 8.9 oz)   SpO2 96%   BMI 23.54 kg/m²   Physical Exam  Constitutional:       Appearance: Normal appearance.   HENT:      Head: Normocephalic and atraumatic.   Eyes:      Extraocular Movements: Extraocular movements intact.      Conjunctiva/sclera: Conjunctivae normal.   Cardiovascular:      Rate and Rhythm: Normal rate.   Pulmonary:      Effort: Pulmonary effort is normal. No respiratory distress.      Breath sounds: Normal breath sounds.   Abdominal:       "General: Abdomen is flat.      Palpations: Abdomen is soft.   Musculoskeletal:         General: No swelling. Normal range of motion.   Neurological:      General: No focal deficit present.      Mental Status: He is alert and oriented to person, place, and time.         LABORATORY DATA:   No results found for: \"WBC\", \"RBC\", \"HEMOGLOBIN\", \"HEMATOCRIT\", \"MCV\", \"MCH\", \"MCHC\", \"RDW\", \"PLATELETCT\", \"MPV\", \"NEUTSPOLYS\", \"LYMPHOCYTES\", \"MONOCYTES\", \"EOSINOPHILS\", \"BASOPHILS\", \"HYPOCHROMIA\", \"ANISOCYTOSIS\"   No results found for: \"SODIUM\", \"POTASSIUM\", \"CHLORIDE\", \"CO2\", \"GLUCOSE\", \"BUN\", \"CREATININE\", \"BUNCREATRAT\", \"GLOMRATE\"      RADIOLOGY DATA:  CT-CHEST (THORAX) W/O    Result Date: 5/2/2024  1. Slightly decreased 16 x 11 mm spiculated right upper lobe nodule. 2. Stable bandlike opacity in the right lower lobe with adjacent small pleural effusion. Therefore, likely scarring and rounded atelectasis with a small effusion. Malignancy is less likely. Recommend continued follow-up. 3. No adenopathy. 4. Stable 2 cm left thyroid nodule. Fleischner Society pulmonary nodule recommendations: Not Applicable       IMPRESSION:    A 78 y.o. 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 on 7/17/2023  Histopathologic type: Adenocarcinoma, NOS  Stage prefix: Initial diagnosis      CANCER STATUS:  Disease Stable    RECOMMENDATIONS:   I reviewed the images personally and serially compared the treated area in the right upper lobe over his last few scans.  Overall, it shows favorable changes, and the scan appears to be quite reassuring.  No major concerning findings.  Will continue routine surveillance with another CT chest and follow-up in 3 months.    Thank you for the opportunity to participate in his care.  If any questions or comments, please do not hesitate in calling.    Orders Placed " This Encounter    CT-CHEST (THORAX) W/O

## 2024-05-06 NOTE — PROGRESS NOTES
Patient was seen today in clinic with Dr. Davis for follow up.  Vitals signs and weight were obtained and pain assessment was completed.  Allergies and medications were reviewed with the patient.      Vitals/Pain:  Vitals:    05/06/24 1256   BP: (!) 176/67   BP Location: Left arm   Patient Position: Sitting   BP Cuff Size: Adult   Pulse: (!) 55   Temp: 36.3 °C (97.3 °F)   TempSrc: Temporal   SpO2: 96%   Weight: 69.2 kg (152 lb 8.9 oz)   Pain Score: No pain        Allergies:   Lisinopril    Current Medications:  Current Outpatient Medications   Medication Sig Dispense Refill    carvedilol (COREG) 3.125 MG Tab Take 3.125 mg by mouth 2 times a day with meals.      allopurinol (ZYLOPRIM) 100 MG Tab Take 100 mg by mouth every day.      magnesium oxide (MAG-OX) 400 MG Tab tablet Take 400 mg by mouth every day.      Cobalamine Combinations (LIVER-B-12 PO) Take  by mouth.      sildenafil citrate (VIAGRA) 100 MG tablet Take 0.5-1 Tabs by mouth as needed for Erectile Dysfunction. (Patient not taking: Reported on 2/1/2024) 10 Tab 3    POTASSIUM PO Take  by mouth.      triamterene/hctz (MAXZIDE-25/DYAZIDE) 37.5-25 MG CAPS Take 1 Cap by mouth every morning. 90 Cap 3    aspirin EC (ECOTRIN) 81 MG TBEC Take 81 mg by mouth every day. (Patient not taking: Reported on 7/17/2023)      Cholecalciferol (VITAMIN D PO) Take  by mouth.      FOLIC ACID PO Take  by mouth.      simvastatin (ZOCOR) 10 MG TABS Take 10 mg by mouth every evening.       No current facility-administered medications for this encounter.           Grisel Maldonado, Med Ass't

## 2024-08-06 ENCOUNTER — HOSPITAL ENCOUNTER (OUTPATIENT)
Dept: RADIOLOGY | Facility: MEDICAL CENTER | Age: 79
End: 2024-08-06
Attending: RADIOLOGY
Payer: MEDICARE

## 2024-08-06 DIAGNOSIS — C34.11 PRIMARY ADENOCARCINOMA OF UPPER LOBE OF RIGHT LUNG (HCC): ICD-10-CM

## 2024-08-06 PROCEDURE — 71250 CT THORAX DX C-: CPT

## 2024-08-09 ENCOUNTER — HOSPITAL ENCOUNTER (OUTPATIENT)
Dept: RADIATION ONCOLOGY | Facility: MEDICAL CENTER | Age: 79
End: 2024-08-09
Attending: RADIOLOGY
Payer: COMMERCIAL

## 2024-08-09 VITALS
HEART RATE: 99 BPM | DIASTOLIC BLOOD PRESSURE: 73 MMHG | WEIGHT: 153.88 LBS | OXYGEN SATURATION: 92 % | SYSTOLIC BLOOD PRESSURE: 155 MMHG | TEMPERATURE: 98.2 F | BODY MASS INDEX: 23.75 KG/M2

## 2024-08-09 DIAGNOSIS — C34.11 PRIMARY ADENOCARCINOMA OF UPPER LOBE OF RIGHT LUNG (HCC): ICD-10-CM

## 2024-08-09 PROCEDURE — 99213 OFFICE O/P EST LOW 20 MIN: CPT | Performed by: RADIOLOGY

## 2024-08-09 PROCEDURE — 99212 OFFICE O/P EST SF 10 MIN: CPT | Performed by: RADIOLOGY

## 2024-08-09 ASSESSMENT — PAIN SCALES - GENERAL: PAINLEVEL: NO PAIN

## 2024-08-09 NOTE — PROGRESS NOTES
RADIATION ONCOLOGY FOLLOW-UP    Patient name:  Steve Cates    Primary Physician:  Pcp Not In Computer MRN: [id number]  CSN: [id number]   Referring physician:  Gaby Rao M.D.  : 1945, 79 y.o.     DATE OF SERVICE: 2024    IDENTIFICATION:   A 79 y.o. 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 on 2023  Histopathologic type: Adenocarcinoma, NOS  Stage prefix: Initial diagnosis      RADIATION SUMMARY:  Radiation Oncology          8/3/2023 2023   Aria Course Treatment Dates   Course First Treatment Date 2023    Course Last Treatment Date 2023    Aria Treatment Summary   SBRT RUL Lung  Plan from Course C1_RUL SBRT   Fraction 4 of 5 5 of 5    5 of 5   Elapsed Course Days 3 @ [id number] 4 @ [id number]    4 @ [id number]   Prescribed Fraction Dose 1,100 cGy 1,100 cGy    1,100 cGy   Prescribed Total Dose 5,500 cGy 5,500 cGy    5,500 cGy   SBRT RUL Lung  Reference Point from Course C1_RUL SBRT   Elapsed Course Days 3 @ [id number] 4 @ [id number]    4 @ [id number]   Session Dose 1,100 cGy 1,100 cGy    --   Total Dose 4,400 cGy 5,500 cGy    5,500 cGy      Details          More values are hidden. Newest values shown. Go to activity for more data.                HISTORY OF PRESENT ILLNESS:  Subjective      This is a 77-year-old gentleman who comes to discuss treatment options for his early-stage lung cancer.  In brief, his history dates back to 2022 when he was noted to have an enlarging nodule in the right upper lobe has been followed since 2021.  As a result and given his prior smoking history, this was worked up with a PET scan that found it to be slightly larger than prior measuring 1.5 cm with a relatively low uptake of SUV 1.3.  No other disease was present.     [facility] at which point I had felt that given that it was relatively small and not PET avid, which ideally proceed with biopsy if this was suspicious.  This was ultimately completed on June 21 that confirmed non-small cell lung cancer, adenocarcinoma.  He met with Dr. Rao, and reviewed treatment options including surgery versus SBRT.  I do not have recent PFTs, but [facility], DLCO was 49%.  He was felt to be relatively reasonable candidate for surgery or radiation, and wanted to discuss radiation further for which he presents today.  He does not have any major cardiopulmonary complaints, and does not have any pain or local symptoms of the location of the nodule.  He does say that he does not exert himself significantly so as to not have significant dyspnea or limitations, and his most significant physical activity is mowing the lawn which is tolerable for him.  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          9/14/23 Doing well post SBRT, no major concerns today, breathing stable, no chest wall pain, looking forward to followup.     10/26/23 we are having a telephone follow-up today after his recent CT of the chest.  He is doing well, no major concerns, breathing is stable, no chest wall pain or any other complaints.  He is eager to review his CT.      2/1/24 he is doing great.  No questions or concerns today.  Feeling very well.  No cardiopulmonary complaints or chest wall pain.  Breathing is stable.    5/6/2024 he is doing well.  No chest wall pain.  No cardiopulmonary complaints.  Breathing is stable.  Looking forward to reviewing the recent CT chest.          INTERVAL HISTORY:  8/9/2024 he comes today for routine follow-up.  Continues to feel well.  No cardiopulmonary complaints.  No chest wall pain.  Breathing is stable.    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      allopurinol (ZYLOPRIM) 100 MG Tab Take 100 mg by mouth every day.      magnesium oxide (MAG-OX) 400 MG Tab tablet Take 400 mg by mouth every day.      Cobalamine Combinations (LIVER-B-12 PO) Take  by mouth.      sildenafil citrate (VIAGRA) 100 MG tablet Take 0.5-1 Tabs by mouth as needed for Erectile Dysfunction. (Patient not taking: Reported on 2/1/2024) 10 Tab 3    POTASSIUM PO Take  by mouth.      triamterene/hctz (MAXZIDE-25/DYAZIDE) 37.5-25 MG CAPS Take 1 Cap by mouth every morning. 90 Cap 3    aspirin EC (ECOTRIN) 81 MG TBEC Take 81 mg by mouth every day. (Patient not taking: Reported on 7/17/2023)      Cholecalciferol (VITAMIN D PO) Take  by mouth.      FOLIC ACID PO Take  by mouth.      simvastatin (ZOCOR) 10 MG TABS Take 10 mg by mouth every evening.       No current facility-administered medications for this encounter.       ALLERGIES:  Lisinopril    REVIEW OF SYSTEMS:    A complete review of system taken. Pertinent items in HPI.  All others negative.    PHYSICAL EXAM:  PERFORMANCE STATUS:       No data to display                   No data to display              BP (!) 155/73 (BP Location: Left arm, Patient Position: Sitting, BP Cuff Size: Adult)   Pulse 99   Temp 36.8 °C (98.2 °F) (Temporal)   Wt 69.8 kg (153 lb 14.1 oz)   SpO2 92%   BMI 23.75 kg/m²   Physical Exam  Constitutional:       Appearance: Normal appearance.   Cardiovascular:      Rate and Rhythm: Normal rate.   Pulmonary:      Effort: Pulmonary effort is normal. No respiratory distress.      Breath sounds: Normal breath sounds.   Neurological:      General: No focal  "deficit present.      Mental Status: He is alert and oriented to person, place, and time.         LABORATORY DATA:   No results found for: \"WBC\", \"RBC\", \"HEMOGLOBIN\", \"HEMATOCRIT\", \"MCV\", \"MCH\", \"MCHC\", \"RDW\", \"PLATELETCT\", \"MPV\", \"NEUTSPOLYS\", \"LYMPHOCYTES\", \"MONOCYTES\", \"EOSINOPHILS\", \"BASOPHILS\", \"HYPOCHROMIA\", \"ANISOCYTOSIS\"   No results found for: \"SODIUM\", \"POTASSIUM\", \"CHLORIDE\", \"CO2\", \"GLUCOSE\", \"BUN\", \"CREATININE\", \"BUNCREATRAT\", \"GLOMRATE\"      RADIOLOGY DATA:  CT-CHEST (THORAX) W/O    Result Date: 8/6/2024  1.  Overall stable exam with no significant change in the anterior right upper lobe 16 x 11 mm spiculated nodule. 2.  There is slight increase in linear opacities in the adjacent lung parenchyma and pleura which is most likely posttreatment change. 3.  There are 5 mm probable areas of mucous plugging likely related to posttreatment inflammatory process in the posterior medial right upper lobe and lateral left lower lobe as described above with cavitary metastatic nodules considered less likely. 4.  No metastatic adenopathy. 5.  Stable underlying emphysematous change.       IMPRESSION:    A 79 y.o. 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 on 7/17/2023  Histopathologic type: Adenocarcinoma, NOS  Stage prefix: Initial diagnosis      CANCER STATUS:  Disease Stable    RECOMMENDATIONS:   I reviewed his recent CT chest.  There are no concerning findings.  The treated area is essentially stable.  He has some small areas of mucous plugging that we can continue to monitor.  He is now 1 year out from the completion of his radiation.  Will proceed with surveillance with next CT and follow-up in 4 months.    Thank you for the opportunity to participate in his care.  If any questions or comments, please do not hesitate in calling.    Orders Placed This Encounter "    CT-CHEST (THORAX) W/O

## 2024-12-09 ENCOUNTER — HOSPITAL ENCOUNTER (OUTPATIENT)
Dept: RADIOLOGY | Facility: MEDICAL CENTER | Age: 79
End: 2024-12-09
Attending: RADIOLOGY
Payer: MEDICARE

## 2024-12-09 DIAGNOSIS — C34.11 PRIMARY ADENOCARCINOMA OF UPPER LOBE OF RIGHT LUNG (HCC): ICD-10-CM

## 2024-12-09 PROCEDURE — 71250 CT THORAX DX C-: CPT

## 2024-12-19 ENCOUNTER — HOSPITAL ENCOUNTER (OUTPATIENT)
Dept: RADIATION ONCOLOGY | Facility: MEDICAL CENTER | Age: 79
End: 2024-12-19
Attending: RADIOLOGY
Payer: COMMERCIAL

## 2024-12-19 DIAGNOSIS — C34.11 PRIMARY ADENOCARCINOMA OF UPPER LOBE OF RIGHT LUNG (HCC): ICD-10-CM

## 2024-12-19 NOTE — PROGRESS NOTES
This visit is being conducted by telephone. This telephone visit was initiated by the patient and they verbally consented.  Patient at home in [facility].      Reason for Call: Posttreatment follow-up    Treatment History:     Radiation Oncology          8/3/2023 8/4/2023   Aria Course Treatment Dates   Course First Treatment Date 07/31/2023 07/31/2023    Course Last Treatment Date 08/03/2023 08/04/2023    Aria Treatment Summary   SBRT RUL Lung  Plan from Course C1_RUL SBRT   Fraction 4 of 5 5 of 5    5 of 5   Elapsed Course Days 3 @ [id number] 4 @ [id number]    4 @ [id number]   Prescribed Fraction Dose 1,100 cGy 1,100 cGy    1,100 cGy   Prescribed Total Dose 5,500 cGy 5,500 cGy    5,500 cGy   SBRT RUL Lung  Reference Point from Course C1_RUL SBRT   Elapsed Course Days 3 @ [id number] 4 @ [id number]    4 @ [id number]   Session Dose 1,100 cGy 1,100 cGy    --   Total Dose 4,400 cGy 5,500 cGy    5,500 cGy      Details          More values are hidden. Newest values shown. Go to activity for more data.                HPI:    Subjective    This is a 77-year-old gentleman who comes to discuss treatment options for his early-stage lung cancer.  In brief, his history dates back to December 2022 when he was noted to have an enlarging nodule in the right upper lobe has been followed since March 2021.  As a result and given his prior smoking history, this was worked up with a PET scan that found it to be slightly larger than prior measuring 1.5 cm with a relatively low uptake of SUV 1.3.  No other disease was present.     [facility] at which point I had felt that given that it was relatively small and not PET avid, which ideally proceed with biopsy if this was suspicious.  This was ultimately completed on June 21 that confirmed non-small cell lung cancer, adenocarcinoma.  He met with Dr. Rao, and reviewed treatment options including surgery versus SBRT.  I do not have recent  PFTs, but [facility], DLCO was 49%.  He was felt to be relatively reasonable candidate for surgery or radiation, and wanted to discuss radiation further for which he presents today.  He does not have any major cardiopulmonary complaints, and does not have any pain or local symptoms of the location of the nodule.  He does say that he does not exert himself significantly so as to not have significant dyspnea or limitations, and his most significant physical activity is mowing the lawn which is tolerable for him.  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          9/14/23 Doing well post SBRT, no major concerns today, breathing stable, no chest wall pain, looking forward to followup.     10/26/23 we are having a telephone follow-up today after his recent CT of the chest.  He is doing well, no major concerns, breathing is stable, no chest wall pain or any other complaints.  He is eager to review his CT.      2/1/24 he is doing great.  No questions or concerns today.  Feeling very well.  No cardiopulmonary complaints or chest wall pain.  Breathing is stable.     5/6/2024 he is doing well.  No chest wall pain.  No cardiopulmonary complaints.  Breathing is stable.  Looking forward to reviewing the recent CT chest.     8/9/2024 he comes today for routine follow-up.  Continues to feel well.  No cardiopulmonary complaints.  No chest wall pain.  Breathing is stable.        Interval History:   12/19/2024 he is doing well.  Feeling well.  No major cardiopulmonary issues.  He had some mild congestion about 2 weeks ago that he is recovering from.  Other than that, no recent health problems.    Labs / Images Reviewed:   CT-CHEST (THORAX) W/O    Result Date: 12/10/2024  1. About 9 tiny lung nodules scattered bilaterally are new since 8/6/2024. Short term follow-up recommended. A tiny cavitary posterior  right upper lobe has resolved. 2. No change 16 x 11 mm spiculated right upper lobe nodular density with adjacent pleural thickening. 3. New nondisplaced left 8th rib fracture laterally. No lytic lesion. 4. Emphysema. Stable small liver cyst. Stable 2 cm left thyroid nodule.       Assessment:   Primary adenocarcinoma of upper lobe of right lung (HCC)  Staging form: Lung, AJCC 8th Edition  - Clinical stage from 7/17/2023: Stage IA2 (cT1b, cN0, cM0) - Signed by Efrem Davis M.D. on 7/17/2023  Histopathologic type: Adenocarcinoma, NOS  Stage prefix: Initial diagnosis      Cancer Status:   Disease Stable    Plan:   I reviewed his imaging personally.  It shows scattered bilateral small lung nodules.  However, he did have some mild URI symptoms about 2 weeks ago around the time of the CT scan.  None of these nodules appear to be concerning currently.  The treated area also shows expected posttreatment related changes.  Will continue routine CT surveillance.  Next scan and follow-up with me in about 3 to 4 months.    Time Spent on Call: 10 minutes      Time Spent in Preparation: 10 minutes    Total Time Spent: 20 minutes    Efrem Davis M.D.

## 2025-02-24 NOTE — Clinical Note
REFERRAL APPROVAL NOTICE         Sent on February 24, 2025                   Steve Cates  [address] [facility] [address]                   Dear Mr. Cates,    After a careful review of the medical information and benefit coverage, [facility] has processed your referral. See below for additional details.    If applicable, you must be actively enrolled with your insurance for coverage of the authorized service. If you have any questions regarding your coverage, please contact your insurance directly.    REFERRAL INFORMATION   Referral #:  [id number]  Referred-To Department    Referred-By Provider:  Radiation Oncology    Gaby Rao M.D.   [facility]      [address] [facility] [address]  [phone] [address] [place] [id number]  [phone]    Referral Start Date:  02/24/2025  Referral End Date:   09/15/2025             SCHEDULING  If you do not already have an appointment, please call [phone] to make an appointment.     MORE INFORMATION  If you do not already have a [facility] account, sign up at: [email or web address]  You can access your medical information, make appointments, see lab results, billing information, and more.  If you have questions regarding this referral, please contact  the [facility] Referrals department at:             [phone]. Monday - Friday 8:00AM - 5:00PM.     Sincerely,    [facility]

## 2025-03-19 ENCOUNTER — HOSPITAL ENCOUNTER (OUTPATIENT)
Dept: RADIOLOGY | Facility: MEDICAL CENTER | Age: 80
End: 2025-03-19
Attending: RADIOLOGY
Payer: COMMERCIAL

## 2025-03-19 DIAGNOSIS — C34.11 PRIMARY ADENOCARCINOMA OF UPPER LOBE OF RIGHT LUNG (HCC): ICD-10-CM

## 2025-03-19 PROCEDURE — 71250 CT THORAX DX C-: CPT

## 2025-03-21 ENCOUNTER — HOSPITAL ENCOUNTER (OUTPATIENT)
Dept: RADIATION ONCOLOGY | Facility: MEDICAL CENTER | Age: 80
End: 2025-03-21
Attending: RADIOLOGY
Payer: COMMERCIAL

## 2025-03-21 DIAGNOSIS — C34.11 PRIMARY ADENOCARCINOMA OF UPPER LOBE OF RIGHT LUNG (HCC): ICD-10-CM

## 2025-03-24 NOTE — PROGRESS NOTES
This visit is being conducted by telephone. This telephone visit was initiated by the patient and they verbally consented.  Patient at home in [facility].      Reason for Call:  Post tx FU     Treatment History:     Radiation Oncology          8/3/2023 8/4/2023   [facility] Course Treatment Dates   Course First Treatment Date 07/31/2023 07/31/2023    Course Last Treatment Date 08/03/2023 08/04/2023    [facility] Treatment Summary   SBRT RUL Lung  Plan from Course C1_RUL SBRT   Fraction 4 of 5 5 of 5    5 of 5   Elapsed Course Days 3 @ [id number] 4 @ [id number]    4 @ [id number]   Prescribed Fraction Dose 1,100 cGy 1,100 cGy    1,100 cGy   Prescribed Total Dose 5,500 cGy 5,500 cGy    5,500 cGy   SBRT RUL Lung  Reference Point from Course C1_RUL SBRT   Elapsed Course Days 3 @ [id number] 4 @ [id number]    4 @ [id number]   Session Dose 1,100 cGy 1,100 cGy    --   Total Dose 4,400 cGy 5,500 cGy    5,500 cGy      Details          More values are hidden. Newest values shown. Go to activity for more data.                HPI:    Subjective    This is a 77-year-old gentleman who comes to discuss treatment options for his early-stage lung cancer.  In brief, his history dates back to December 2022 when he was noted to have an enlarging nodule in the right upper lobe has been followed since March 2021.  As a result and given his prior smoking history, this was worked up with a PET scan that found it to be slightly larger than prior measuring 1.5 cm with a relatively low uptake of SUV 1.3.  No other disease was present.     [facility], at which point I had felt that given that it was relatively small and not PET avid, which ideally proceed with biopsy if this was suspicious.  This was ultimately completed on June 21 that confirmed non-small cell lung cancer, adenocarcinoma.  He met with Dr. Rao, and reviewed treatment options including surgery versus SBRT.  I do not have recent PFTs, but he  did [facility], DLCO was 49%.  He was felt to be relatively reasonable candidate for surgery or radiation, and wanted to discuss radiation further for which he presents today.  He does not have any major cardiopulmonary complaints, and does not have any pain or local symptoms of the location of the nodule.  He does say that he does not exert himself significantly so as to not have significant dyspnea or limitations, and his most significant physical activity is mowing the lawn which is tolerable for him.  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          9/14/23 Doing well post SBRT, no major concerns today, breathing stable, no chest wall pain, looking forward to followup.     10/26/23 we are having a telephone follow-up today after his recent CT of the chest.  He is doing well, no major concerns, breathing is stable, no chest wall pain or any other complaints.  He is eager to review his CT.      2/1/24 he is doing great.  No questions or concerns today.  Feeling very well.  No cardiopulmonary complaints or chest wall pain.  Breathing is stable.     5/6/2024 he is doing well.  No chest wall pain.  No cardiopulmonary complaints.  Breathing is stable.  Looking forward to reviewing the recent CT chest.     8/9/2024 he comes today for routine follow-up.  Continues to feel well.  No cardiopulmonary complaints.  No chest wall pain.  Breathing is stable.      12/19/2024 he is doing well.  Feeling well.  No major cardiopulmonary issues.  He had some mild congestion about 2 weeks ago that he is recovering from.  Other than that, no recent health problems.         Interval History:   3/21/25 he is doing very well.  No major complaints.  Scans reviewed personally, favorable treatment effect, stable otherwise from last scan.  Overall no concerning findings.    Labs / Images Reviewed:   CT-CHEST ([facility]AX)  W/O  Result Date: 3/20/2025  1. Resolution of 9 tiny lung densities which were new on 12/9/2024 CT. 2. Right lung apex spiculated nodule laterally is unchanged from most recent CT of 12/9/2024 but has shown slight growth since 5/1/2024. 3. Emphysema. Stable lateral right lung base scar or atelectasis. 4. Stable small 13 mm posterior left lung base noncalcified pleural plaque. 5. Stable tiny liver cyst.      Assessment:   Primary adenocarcinoma of upper lobe of right lung (HCC)  Staging form: Lung, AJCC 8th Edition  - Clinical stage from 7/17/2023: Stage IA2 (cT1b, cN0, cM0) - Signed by Efrem Davis M.D. on 7/17/2023  Histopathologic type: Adenocarcinoma, NOS  Stage prefix: Initial diagnosis      Cancer Status:   Disease Stable    Plan:   We will continue routine surveillance and follow-up.  Will proceed with 6-month follow-up now.  Follow-up with me with CT chest in 6 months.    Time Spent on Call: 5 minutes      Time Spent in Preparation:5 min    Total Time Spent: 10 min    Efrem Davis M.D.